# Patient Record
Sex: FEMALE | NOT HISPANIC OR LATINO | Employment: OTHER | URBAN - METROPOLITAN AREA
[De-identification: names, ages, dates, MRNs, and addresses within clinical notes are randomized per-mention and may not be internally consistent; named-entity substitution may affect disease eponyms.]

---

## 2018-01-19 ENCOUNTER — THERAPY VISIT (OUTPATIENT)
Dept: PHYSICAL THERAPY | Facility: CLINIC | Age: 68
End: 2018-01-19
Payer: COMMERCIAL

## 2018-01-19 DIAGNOSIS — G89.29 CHRONIC BILATERAL LOW BACK PAIN WITHOUT SCIATICA: Primary | Chronic | ICD-10-CM

## 2018-01-19 DIAGNOSIS — M54.50 CHRONIC BILATERAL LOW BACK PAIN WITHOUT SCIATICA: Primary | Chronic | ICD-10-CM

## 2018-01-19 DIAGNOSIS — Z98.890 H/O LUMBOSACRAL SPINE SURGERY: ICD-10-CM

## 2018-01-19 PROCEDURE — 97110 THERAPEUTIC EXERCISES: CPT | Mod: GP | Performed by: PHYSICAL THERAPIST

## 2018-01-19 PROCEDURE — 97140 MANUAL THERAPY 1/> REGIONS: CPT | Mod: GP | Performed by: PHYSICAL THERAPIST

## 2018-01-19 PROCEDURE — 97161 PT EVAL LOW COMPLEX 20 MIN: CPT | Mod: GP | Performed by: PHYSICAL THERAPIST

## 2018-01-19 NOTE — LETTER
[facility] ATHLETIC [facility] PHYSICAL [facility]  [address]  [facility] [id number] [phone]    2018    Re: Halina Edwards   :   1950  MRN:  [id number]   REFERRING PHYSICIAN:   Edelmira Castillo    [facility] ATHLETIC [facility]  Date of Initial Evaluation:  18  Visits:  Rxs Used: 1  Reason for Referral:     Chronic bilateral low back pain without sciatica  H/O lumbosacral spine surgery    EVALUATION SUMMARY    [facility] Initial Evaluation  Subjective:  Patient is a 67 year old female presenting with rehab back hpi. The history is provided by the patient. No  was used.   Halina Edwards is a 67 year old female with a lumbar condition.  Condition occurred with:  Degenerative joint disease.  Condition occurred: for unknown reasons.  This is a chronic condition  Has been dealing with low back pain for the last year.   Unknown cause. Originally tried treating with chiro without relief.  Had a laminectomy/discectomy in low back in 2017 (Dr. Bullard).  Pain never seemed to really improve.  Has had a lot of tenderness in low back after surgery.  Most current MRI after surgery showed a lot of inflammation in low back.  Pain worse with sitting too long (>1 hr), standing too long (5-10 min).  Pain significantly increases with walking >5-10 min.  Sleeping not going well.  Waking ~2-3x/night.  Low back feels better with sitting within reasonable amount of time..    Patient reports pain:  Lower lumbar spine.  Radiates to:  Gluteals left and gluteals right.  Pain is described as aching, sharp, burning and stabbing and is constant and reported as 8/10.  Associated symptoms:  Loss of motion/stiffness, loss of motion and loss of strength.   Symptoms are exacerbated by certain positions, lying down, twisting, sitting, lifting, walking and standing and relieved by rest.  Since onset symptoms are unchanged.     Previous treatment includes chiropractic and surgery.  There was mild improvement following previous treatment.  General health as reported by patient is fair.  Pertinent medical history includes:  Diabetes, overweight, high blood pressure and smoking.  Medical allergies: yes (penecillin).  Other surgeries include:  Orthopedic surgery (lumbar decompression).  Current medications:  Pain medication, anti-depressants and high blood pressure medication.  Current occupation is Sales    Pt goal: improve tolerance to walking and standing tolerance.  Patient is working in normal job without restrictions.  Primary job tasks include:  Prolonged sitting and repetitive tasks.    Barriers include:  None as reported by the patient.    Red flags:  None as reported by the patient.    Objective:                                    Musculoskeletal:        Arms:      Halina CLEMENT Jerry , : 1950, MRN: [id number]    Physical Therapy Objective Findings  Subjective information, goals, clinical impression, daily documentation and other information found in EPISODES tab.  Objective:     Lumbar Pain    Posture: sitting: mild slouch, posture correction: no change, standing:   Gait:  Decreased rotation  Lumbar Range of Motion:  Flexion        With amirah                                         75%                                                                                                                         Increased pain in low back   Extension 25%   Right Side Bending 25%   Left Side Bending 25%   Repeated extension- standing    Repeated flexion- standing      Pelvic screen:                                                                         Positive                                            Negative                                             Standing Forward Bend  x   Gillet(March)  x   Supine to sit     Sacroiliac provocation test  x   Pubic symphysis provocation            -resisted hip add at 45  x   Other:       Hip  Screen:                                                                  Positive                                             Negative                                             Hip ROM X limited IR B in 90 flex, limited hip ER in prone B    Scour  x   MARJORIE X B    FADIR  x   Other:     Manual Muscle Testing (graded 0-5, measured at 0 degrees unless otherwise noted):                                                                              Right                                  Left                                                     Transversus Abdominus     -Ramos Leg Lowering (deg) 50%    Hip Flex L2 4 (+) 4 (+)   Hip Abd 3 3   Hip Add     Hip Ext 3 3   Knee Flex 4 (+) 4+ (+)   Knee Ext L3 5 5   Ankle Dorsiflexion L4 5 5   Great Toe Extension L5 5 5   Ankle Plantar Flexion S1 5 5   Other:     (+ mild pain, ++ moderate pain, +++ severe pain)    Special Tests:                                                                     Positive                                             Negative                                             Sign of Buttock  x   SLR X B    Robert Test X - R 7 de, L 10 deg    Ely Test     Prone instability Test X L3    Crossover SLR     Repeated extension prone     Other:       Flexibility:                                                              Right                                                 Left                                                      Hamstring SLR 70  SLR 70   Hip flexor 7 deg 10 deg   Quadricep Mod tightness Mod tightness   Suzanne's     piriformis Mod tightness Mod tightness   Other:            Segmental Mobility: hypomobile L5, T12-L2    Palpation: hypomobile incision    -If symptoms past hip, must do neuro testing  Dermatome/Sensory  Testing: normal to light touch BLE  Reflex Testing:                                                                 Right                                                  Left                                                      Patellar Tendon normal normal   Achilles Tendon normal normal   Babinski         Assessment/Plan:    Patient is a 67 year old female with lumbar complaints.    Patient has the following significant findings with corresponding treatment plan.                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  Pain -  hot/cold therapy, manual therapy, self management, education and home program  Decreased ROM/flexibility - manual therapy, therapeutic exercise, therapeutic activity and home program  Decreased joint mobility - manual therapy, therapeutic exercise, therapeutic activity and home program  Decreased strength - therapeutic exercise, therapeutic activities and home program  Decreased function - therapeutic activities and home program  Impaired posture - neuro re-education, therapeutic activities and home program    Therapy Evaluation Codes:   1) History comprised of:   Personal factors that impact the plan of care:      Age, Past/current experiences, Profession and Time since onset of symptoms.    Comorbidity factors that impact the plan of care are:      Diabetes, Depression and Overweight.     Medications impacting care: Anti-depressant, Anti-inflammatory and Pain.  2) Examination of Body Systems comprised of:   Body structures and functions that impact the plan of care:      Lumbar spine.   Activity limitations that impact the plan of care are:      Bathing, Driving, Lifting, Sitting, Stairs, Standing, Walking and Working.  3) Clinical presentation characteristics are:   Stable/Uncomplicated.  4) Decision-Making    Low complexity using standardized patient assessment instrument and/or measureable assessment of functional outcome.  Cumulative Therapy Evaluation is: Low complexity.    Previous and current functional limitations:  (See Goal Flow Sheet for this information)    Short term and Long term goals: (See Goal Flow Sheet for this information)      Communication ability:  Patient appears to be able to clearly communicate and understand verbal and written communication and follow directions correctly.  Treatment Explanation - The following has been discussed with the patient:   RX ordered/plan of care  Anticipated outcomes  Possible risks and side effects  This patient would benefit from PT intervention to resume normal activities.   Rehab potential is good.    Frequency:  1 X week, once daily  Duration:  for 6 weeks  Discharge Plan:  Achieve all LTG.  Independent in home treatment program.  Reach maximal therapeutic benefit.      Thank you for your referral.    INQUIRIES  Therapist: Gael Vizcaino,PT, DPT, [facility]    [facility]  [address] [place] [address]. [address]  [facility] [address]  Phone: [phone]  Fax: [phone]

## 2018-01-19 NOTE — MR AVS SNAPSHOT
"              After Visit Summary   1/19/2018    Halina Edwards    MRN: [id number]           Patient Information     Date Of Birth          1950        Visit Information        Provider Department      1/19/2018 7:00 AM Gael Vizcaino, PT [facility] Physical Therapy        Today's Diagnoses     Chronic bilateral low back pain without sciatica    -  1    H/O lumbosacral spine surgery           Follow-ups after your visit        Your next 10 appointments already scheduled     Jan 25, 2018  4:30 PM CST   JACQUELIN Spine with Gael Vizcaino PT   [facility] ([facility]  )    [address]  [facility] [address] [phone]              Who to contact     If you have questions or need follow up information about today's clinic visit or your schedule please contact [facility] ATHLETIC [facility] PHYSICAL THERAPY directly at [phone].  Normal or non-critical lab and imaging results will be communicated to you by [employer], letter or phone within 4 business days after the clinic has received the results. If you do not hear from us within 7 days, please contact the clinic through [employer] or phone. If you have a critical or abnormal lab result, we will notify you by phone as soon as possible.  Submit refill requests through [employer] or call your pharmacy and they will forward the refill request to us. Please allow 3 business days for your refill to be completed.          Additional Information About Your Visit        [employer] Information     [employer] lets you send messages to your doctor, view your test results, renew your prescriptions, schedule appointments and more. To sign up, go to [email or web address] . Click on \"Log in\" on the left side of the screen, which will take you to the Welcome page. Then click on \"Sign up Now\" on the right side of the page.     You will be asked to enter the access code " listed below, as well as some personal information. Please follow the directions to create your username and password.     Your access code is: [email or web address]  Expires: 2018  9:36 AM     Your access code will  in 90 days. If you need help or a new code, please call your [place] clinic or [phone].        Care EveryWhere ID     This is your Care EveryWhere ID. This could be used by other organizations to access your [facility] medical records  [id number]        Your Vitals Were     Last Period                   09/10/2005            Blood Pressure from Last 3 Encounters:   09/08/10 131/77   06/10/10 151/82   04/01/10 145/81    Weight from Last 3 Encounters:   05 109.2 kg (240 lb 12 oz)   09/15/05 109.8 kg (242 lb)   05 108.4 kg (239 lb 0.6 oz)              We Performed the Following     HC PT EVAL, LOW COMPLEXITY     JACQUELIN INITIAL EVAL REPORT     MANUAL THER TECH,1+REGIONS,EA 15 MIN     THERAPEUTIC EXERCISES        Primary Care Provider Office Phone # Fax #    Halina Arias PA-C [phone]       [address] [place] [address]        Equal Access to Services     GURPREET BACON AH: Hadii aad ku hadasho Soomaali, waaxda luqadaha, qaybta kaalmada adeegyada, braulio lopezin hayrenyn jeffry león. So [facility] [phone].    ATENCIÓN: Si habla español, tiene a brewer disposición servicios gratuitos de asistencia lingüística. Llame al [phone].    We comply with applicable federal civil rights laws and [place] laws. We do not discriminate on the basis of race, color, national origin, age, disability, sex, sexual orientation, or gender identity.            Thank you!     Thank you for choosing [facility]  for your care. Our goal is always to provide you with excellent care. Hearing back from our patients is one way we can continue to improve our services. Please take a few minutes to complete the written survey that you may receive  in the mail after your visit with us. Thank you!             Your Updated Medication List - Protect others around you: Learn how to safely use, store and throw away your medicines at www.disposemymeds.org.          This list is accurate as of: 1/19/18  9:36 AM.  Always use your most recent med list.                   Brand Name Dispense Instructions for use Diagnosis    azithromycin 250 MG tablet    ZITHROMAX    6 Tab    take 1 Tab by mouth daily. two tablets first day and 1 tablet daily for 4 days    Maxillary sinusitis       benzonatate 200 MG capsule    TESSALON    20 Cap    take 1 Cap by mouth 3 times daily as needed for cough.    Maxillary sinusitis       metFORMIN 500 MG tablet    GLUCOPHAGE     2 tablets twice daily        METOPROLOL TARTRATE      None Entered        NOVOFINE 31 31G X 6 MM   Generic drug:  insulin pen needle     100    use as directed        NovoLOG FLEXpen 100 UNITS/ML injection   Generic drug:  insulin aspart     15    as directed with each of her 3 meals as of 3/10/2005 11 units each meal)        PRAVACHOL PO      None Entered        RAMIPRIL PO      None Entered

## 2018-01-19 NOTE — PROGRESS NOTES
[facility] Initial Evaluation  Subjective:  Patient is a 67 year old female presenting with rehab back hpi. The history is provided by the patient. No  was used.   Halina Edwards is a 67 year old female with a lumbar condition.  Condition occurred with:  Degenerative joint disease.  Condition occurred: for unknown reasons.  This is a chronic condition  Has been dealing with low back pain for the last year.   Unknown cause. Originally tried treating with chiro without relief.  Had a laminectomy/discectomy in low back in June 2017 (Dr. Bullard).  Pain never seemed to really improve.  Has had a lot of tenderness in low back after surgery.  Most current MRI after surgery showed a lot of inflammation in low back.  Pain worse with sitting too long (>1 hr), standing too long (5-10 min).  Pain significantly increases with walking >5-10 min.  Sleeping not going well.  Waking ~2-3x/night.  Low back feels better with sitting within reasonable amount of time..    Patient reports pain:  Lower lumbar spine.  Radiates to:  Gluteals left and gluteals right.  Pain is described as aching, sharp, burning and stabbing and is constant and reported as 8/10.  Associated symptoms:  Loss of motion/stiffness, loss of motion and loss of strength.   Symptoms are exacerbated by certain positions, lying down, twisting, sitting, lifting, walking and standing and relieved by rest.  Since onset symptoms are unchanged.    Previous treatment includes chiropractic and surgery.  There was mild improvement following previous treatment.  General health as reported by patient is fair.  Pertinent medical history includes:  Diabetes, overweight, high blood pressure and smoking.  Medical allergies: yes (penecillin).  Other surgeries include:  Orthopedic surgery (lumbar decompression).  Current medications:  Pain medication, anti-depressants and high blood pressure medication.  Current occupation is Sales    Pt goal:  improve tolerance to walking and standing tolerance.  Patient is working in normal job without restrictions.  Primary job tasks include:  Prolonged sitting and repetitive tasks.    Barriers include:  None as reported by the patient.    Red flags:  None as reported by the patient.                        Objective:  System    Physical Exam                                         Musculoskeletal:        Arms:      JULIAN Edwards , : 1950, MRN: [id number]    Physical Therapy Objective Findings  Subjective information, goals, clinical impression, daily documentation and other information found in EPISODES tab.  Objective:     Lumbar Pain    Posture: sitting: mild slouch, posture correction: no change, standing:   Gait:  Decreased rotation  Lumbar Range of Motion:  Flexion        With amirah                                         75%                                                                                                                         Increased pain in low back   Extension 25%   Right Side Bending 25%   Left Side Bending 25%   Repeated extension- standing    Repeated flexion- standing      Pelvic screen:                                                                         Positive                                            Negative                                             Standing Forward Bend  x   Gillet(March)  x   Supine to sit     Sacroiliac provocation test  x   Pubic symphysis provocation            -resisted hip add at 45  x   Other:       Hip Screen:                                                                  Positive                                             Negative                                             Hip ROM X limited IR B in 90 flex, limited hip ER in prone B    Scour  x   MARJORIE X B    FADIR  x   Other:     Manual Muscle Testing (graded 0-5, measured at 0 degrees unless otherwise noted):                                                                               Right                                  Left                                                     Transversus Abdominus     -Ramos Leg Lowering (deg) 50%    Hip Flex L2 4 (+) 4 (+)   Hip Abd 3 3   Hip Add     Hip Ext 3 3   Knee Flex 4 (+) 4+ (+)   Knee Ext L3 5 5   Ankle Dorsiflexion L4 5 5   Great Toe Extension L5 5 5   Ankle Plantar Flexion S1 5 5   Other:     (+ mild pain, ++ moderate pain, +++ severe pain)    Special Tests:                                                                     Positive                                             Negative                                             Sign of Buttock  x   SLR X B    Robert Test X - R 7 de, L 10 deg    Ely Test     Prone instability Test X L3    Crossover SLR     Repeated extension prone     Other:       Flexibility:                                                              Right                                                 Left                                                      Hamstring SLR 70  SLR 70   Hip flexor 7 deg 10 deg   Quadricep Mod tightness Mod tightness   Suzanne's     piriformis Mod tightness Mod tightness   Other:            Segmental Mobility: hypomobile L5, T12-L2    Palpation: hypomobile incision    -If symptoms past hip, must do neuro testing  Dermatome/Sensory  Testing: normal to light touch BLE  Reflex Testing:                                                                 Right                                                  Left                                                     Patellar Tendon normal normal   Achilles Tendon normal normal   Babinski         Assessment/Plan:    Patient is a 67 year old female with lumbar complaints.    Patient has the following significant findings with corresponding treatment plan.                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  Pain -  hot/cold therapy, manual therapy, self management, education and home  program  Decreased ROM/flexibility - manual therapy, therapeutic exercise, therapeutic activity and home program  Decreased joint mobility - manual therapy, therapeutic exercise, therapeutic activity and home program  Decreased strength - therapeutic exercise, therapeutic activities and home program  Decreased function - therapeutic activities and home program  Impaired posture - neuro re-education, therapeutic activities and home program    Therapy Evaluation Codes:   1) History comprised of:   Personal factors that impact the plan of care:      Age, Past/current experiences, Profession and Time since onset of symptoms.    Comorbidity factors that impact the plan of care are:      Diabetes, Depression and Overweight.     Medications impacting care: Anti-depressant, Anti-inflammatory and Pain.  2) Examination of Body Systems comprised of:   Body structures and functions that impact the plan of care:      Lumbar spine.   Activity limitations that impact the plan of care are:      Bathing, Driving, Lifting, Sitting, Stairs, Standing, Walking and Working.  3) Clinical presentation characteristics are:   Stable/Uncomplicated.  4) Decision-Making    Low complexity using standardized patient assessment instrument and/or measureable assessment of functional outcome.  Cumulative Therapy Evaluation is: Low complexity.    Previous and current functional limitations:  (See Goal Flow Sheet for this information)    Short term and Long term goals: (See Goal Flow Sheet for this information)     Communication ability:  Patient appears to be able to clearly communicate and understand verbal and written communication and follow directions correctly.  Treatment Explanation - The following has been discussed with the patient:   RX ordered/plan of care  Anticipated outcomes  Possible risks and side effects  This patient would benefit from PT intervention to resume normal activities.   Rehab potential is good.    Frequency:  1 X week,  once daily  Duration:  for 6 weeks  Discharge Plan:  Achieve all LTG.  Independent in home treatment program.  Reach maximal therapeutic benefit.    Please refer to the daily flowsheet for treatment today, total treatment time and time spent performing 1:1 timed codes.     Gael Vizcaino,PT, DPT, OCS

## 2018-01-25 ENCOUNTER — THERAPY VISIT (OUTPATIENT)
Dept: PHYSICAL THERAPY | Facility: CLINIC | Age: 68
End: 2018-01-25
Payer: COMMERCIAL

## 2018-01-25 DIAGNOSIS — Z98.890 H/O LUMBOSACRAL SPINE SURGERY: ICD-10-CM

## 2018-01-25 DIAGNOSIS — M54.50 CHRONIC BILATERAL LOW BACK PAIN WITHOUT SCIATICA: ICD-10-CM

## 2018-01-25 DIAGNOSIS — G89.29 CHRONIC BILATERAL LOW BACK PAIN WITHOUT SCIATICA: ICD-10-CM

## 2018-01-25 PROCEDURE — 97110 THERAPEUTIC EXERCISES: CPT | Mod: GP | Performed by: PHYSICAL THERAPIST

## 2018-01-25 PROCEDURE — 97140 MANUAL THERAPY 1/> REGIONS: CPT | Mod: GP | Performed by: PHYSICAL THERAPIST

## 2018-02-01 ENCOUNTER — THERAPY VISIT (OUTPATIENT)
Dept: PHYSICAL THERAPY | Facility: CLINIC | Age: 68
End: 2018-02-01
Payer: COMMERCIAL

## 2018-02-01 DIAGNOSIS — M54.50 CHRONIC BILATERAL LOW BACK PAIN WITHOUT SCIATICA: ICD-10-CM

## 2018-02-01 DIAGNOSIS — G89.29 CHRONIC BILATERAL LOW BACK PAIN WITHOUT SCIATICA: ICD-10-CM

## 2018-02-01 DIAGNOSIS — Z98.890 H/O LUMBOSACRAL SPINE SURGERY: ICD-10-CM

## 2018-02-01 PROCEDURE — 97110 THERAPEUTIC EXERCISES: CPT | Mod: GP | Performed by: PHYSICAL THERAPIST

## 2018-02-01 PROCEDURE — 97140 MANUAL THERAPY 1/> REGIONS: CPT | Mod: GP | Performed by: PHYSICAL THERAPIST

## 2018-02-01 NOTE — MR AVS SNAPSHOT
"              After Visit Summary   2018    Halina Edwards    MRN: [id number]           Patient Information     Date Of Birth          1950        Visit Information        Provider Department      2018 4:30 PM Gael Vizcaino PT [facility]        Today's Diagnoses     Chronic bilateral low back pain without sciatica        H/O lumbosacral spine surgery           Follow-ups after your visit        Who to contact     If you have questions or need follow up information about today's clinic visit or your schedule please contact [facility] directly at [phone].  Normal or non-critical lab and imaging results will be communicated to you by MyChart, letter or phone within 4 business days after the clinic has received the results. If you do not hear from us within 7 days, please contact the clinic through MyChart or phone. If you have a critical or abnormal lab result, we will notify you by phone as soon as possible.  Submit refill requests through [employer] or call your pharmacy and they will forward the refill request to us. Please allow 3 business days for your refill to be completed.          Additional Information About Your Visit        MyChart Information     [employer] lets you send messages to your doctor, view your test results, renew your prescriptions, schedule appointments and more. To sign up, go to [email or web address] . Click on \"Log in\" on the left side of the screen, which will take you to the Welcome page. Then click on \"Sign up Now\" on the right side of the page.     You will be asked to enter the access code listed below, as well as some personal information. Please follow the directions to create your username and password.     Your access code is: [email or web address]  Expires: 2018  9:36 AM     Your access code will  in 90 days. If you need help or a new code, please call your " [facility] or [phone].        Care EveryWhere ID     This is your Care EveryWhere ID. This could be used by other organizations to access your [place] medical records  [id number]        Your Vitals Were     Last Period                   09/10/2005            Blood Pressure from Last 3 Encounters:   09/08/10 131/77   06/10/10 151/82   04/01/10 145/81    Weight from Last 3 Encounters:   09/27/05 109.2 kg (240 lb 12 oz)   09/15/05 109.8 kg (242 lb)   04/07/05 108.4 kg (239 lb 0.6 oz)              We Performed the Following     MANUAL THER TECH,1+REGIONS,EA 15 MIN     THERAPEUTIC EXERCISES        Primary Care Provider Office Phone # Fax #    Halina Arias PA-C [phone] [address] [facility] [address]        Equal Access to Services     [facility]: Hadii aad ku hadasho Soomaali, waaxda luqadaha, qaybta kaalmada adeegyada, waxay idiin hayrodger luque . So [facility] [phone].    ATENCIÓN: Si habla español, tiene a brewer disposición servicios gratuitos de asistencia lingüística. Llame al [phone].    We comply with applicable federal civil rights laws and [place] laws. We do not discriminate on the basis of race, color, national origin, age, disability, sex, sexual orientation, or gender identity.            Thank you!     Thank you for choosing [facility]  for your care. Our goal is always to provide you with excellent care. Hearing back from our patients is one way we can continue to improve our services. Please take a few minutes to complete the written survey that you may receive in the mail after your visit with us. Thank you!             Your Updated Medication List - Protect others around you: Learn how to safely use, store and throw away your medicines at www.disposemymeds.org.          This list is accurate as of 2/1/18  5:10 PM.  Always use your most recent med list.                   Brand Name Dispense  Instructions for use Diagnosis    azithromycin 250 MG tablet    ZITHROMAX    6 Tab    take 1 Tab by mouth daily. two tablets first day and 1 tablet daily for 4 days    Maxillary sinusitis       benzonatate 200 MG capsule    TESSALON    20 Cap    take 1 Cap by mouth 3 times daily as needed for cough.    Maxillary sinusitis       metFORMIN 500 MG tablet    GLUCOPHAGE     2 tablets twice daily        METOPROLOL TARTRATE      None Entered        NOVOFINE 31 31G X 6 MM   Generic drug:  insulin pen needle     100    use as directed        NovoLOG FLEXpen 100 UNITS/ML injection   Generic drug:  insulin aspart     15    as directed with each of her 3 meals as of 3/10/2005 11 units each meal)        PRAVACHOL PO      None Entered        RAMIPRIL PO      None Entered

## 2018-02-01 NOTE — PROGRESS NOTES
Subjective:  HPI  Oswestry Score: 55.56 %                 Objective:  System    Physical Exam    General     ROS    Assessment/Plan:    PROGRESS  REPORT    Progress reporting period is from 1/19/18 to 2/1/18.       SUBJECTIVE  Subjective changes noted by patient:  doing the exercises 1x/day, walking about 30 min, had more pain when she was doing a lot of coughing, sleeping goes pretty well if she takes pain meds before bed, has been dealing with a couple cold in last week, and whole body is more achy    Current Pain level: 9/10.     Previous pain level was  NA Initial Pain level: 10/10.   Changes in function:  Yes (See Goal flowsheet attached for changes in current functional level)  Adverse reaction to treatment or activity: activity - walking too much, sitting too much, standing too much    OBJECTIVE  Changes noted in objective findings:    Objective: + slump R, - slump L, SLR: R 72, L 65 (tingling down L leg), hypomobile incision, hypersensitivity of incision, double leg lowering 60%, MMT: hip abd 3+/5 B, hip flexor contracture: R 5 deg, L 10 deg, + prone instability L3     At eval: + slump B, MMT: hip abd 3/5 B, double leg lowering 50%, hip flex contracture: R 7 deg, L 10 deg    ASSESSMENT/PLAN  Updated problem list and treatment plan: 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 Strength and flexibility better.  Function hasn't changed  Self Management Plans:   Patient has been instructed in a home treatment program.  I have re-evaluated this patient and find that the nature, scope, duration and intensity of the therapy is appropriate for the medical condition of the patient.  Halina continues to require the following intervention to meet STG and LTG's:  PT intervention is no longer required to meet STG/LTG.    Recommendations:  This patient would benefit from continued therapy.     Frequency:  1 X week, once daily  Duration:  for 3 weeks        Please refer to the daily flowsheet for treatment today, total treatment time and time spent performing 1:1 timed codes.      Gael Vizcaino,PT, DPT, OCS

## 2018-04-24 PROBLEM — Z98.890 H/O LUMBOSACRAL SPINE SURGERY: Status: RESOLVED | Noted: 2018-01-19 | Resolved: 2018-04-24

## 2018-04-24 PROBLEM — G89.29 CHRONIC BILATERAL LOW BACK PAIN WITHOUT SCIATICA: Chronic | Status: RESOLVED | Noted: 2018-01-19 | Resolved: 2018-04-24

## 2018-04-24 PROBLEM — M54.50 CHRONIC BILATERAL LOW BACK PAIN WITHOUT SCIATICA: Chronic | Status: RESOLVED | Noted: 2018-01-19 | Resolved: 2018-04-24

## 2018-04-24 NOTE — PROGRESS NOTES
Subjective:  HPI  Oswestry Score: 55.56 %                 Objective:  System    Physical Exam    General     ROS    Assessment/Plan:    DISCHARGE REPORT    Progress reporting period is from 2/1/18 to 4/24/18.       SUBJECTIVE  Subjective changes noted by patient:  Pt has not returned since last PN. Overall change unknown.    At last visit: doing the exercises 1x/day, walking about 30 min, had more pain when she was doing a lot of coughing, sleeping goes pretty well if she takes pain meds before bed, has been dealing with a couple cold in last week, and whole body is more achy    Current Pain level: 9/10.     Previous pain level was  NA Initial Pain level: 10/10.   Changes in function:  None  Adverse reaction to treatment or activity: activity - coughing    OBJECTIVE  Changes noted in objective findings:  Patient has failed to return to therapy so current objective findings are unknown.  Objective: + slump R, - slump L, SLR: R 72, L 65 (tingling down L leg), hypomobile incision, hypersensitivity of incision, double leg lowering 60%, MMT: hip abd 3+/5 B, hip flexor contracture: R 5 deg, L 10 deg     ASSESSMENT/PLAN  Updated problem list and treatment plan: 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    Pain -  home program  Decreased ROM/flexibility - home program  Decreased strength - home program  STG/LTGs have been met or progress has been made towards goals:  None  Assessment of Progress: The patient's condition is unchanged.  The patient has not returned to therapy. Current status is unknown.  Self Management Plans:  Patient has been instructed in a home treatment program.  I have re-evaluated this patient and find that the nature, scope, duration and intensity of the therapy is appropriate for the medical condition of the patient.  Halina continues to require the following intervention to meet STG and LTG's:  PT intervention is no longer required to  meet STG/LTG.    Recommendations:  This patient is ready to be discharged from therapy and continue their home treatment program.    Please refer to the daily flowsheet for treatment today, total treatment time and time spent performing 1:1 timed codes.        Gael Vizcaino,PT, DPT, OCS

## 2018-05-14 ENCOUNTER — THERAPY VISIT (OUTPATIENT)
Dept: PHYSICAL THERAPY | Facility: CLINIC | Age: 68
End: 2018-05-14
Payer: COMMERCIAL

## 2018-05-14 DIAGNOSIS — G89.29 CHRONIC BILATERAL LOW BACK PAIN WITHOUT SCIATICA: Primary | Chronic | ICD-10-CM

## 2018-05-14 DIAGNOSIS — M54.50 CHRONIC BILATERAL LOW BACK PAIN WITHOUT SCIATICA: Primary | Chronic | ICD-10-CM

## 2018-05-14 PROCEDURE — 97110 THERAPEUTIC EXERCISES: CPT | Mod: GP | Performed by: PHYSICAL THERAPIST

## 2018-05-14 PROCEDURE — 97530 THERAPEUTIC ACTIVITIES: CPT | Mod: GP | Performed by: PHYSICAL THERAPIST

## 2018-05-14 NOTE — LETTER
[facility]  [address]  [facility] [id number] [phone]    May 15, 2018    Re: Halina Edwards   :   1950  MRN:  [id number]   REFERRING PHYSICIAN:   Edelmira Castillo    [facility]  Date of Initial Evaluation:  18  Visits:  Rxs Used: 4  Reason for Referral:  Chronic bilateral low back pain without sciatica    PROGRESS  REPORT  Progress reporting period is from 18 to 18.       SUBJECTIVE  Subjective changes noted by patient:  s/p rhizotomy for her low back on 3/20/18, her seems to be slowly getting better, still feels like she is still very limited on her walking, walking limited to 7-8 min, sleeping going pretty well with use of Tylenol PM, mild pain with driving, feels best with sitting    Current Pain level: 3/10 (worst 6/10).     Previous pain level was  9/10 Initial Pain level: 10/10.   Changes in function:  Yes (See Goal flowsheet attached for changes in current functional level)  Adverse reaction to treatment or activity: activity - prolonged walking, standing, exercising    OBJECTIVE  Changes noted in objective findings:    Objective: -standing forward flex, - march, lumbar ROM: flex 80%, ext 50%, R SB 50% ++, L SB 50%, MMT: knee ext 5/5 B, knee flex 4+/5 B, DF 5/5 B, PF 5/5 B, SLR 65 B, 50% restriction B piriformis, - slump B, iso bridge: 0:31/3:00 difficulty 5/5, double leg lowering 50%     ASSESSMENT/PLAN  Updated problem list and treatment plan: 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 with subsequent rhizotomy    Pain -  hot/cold therapy, manual therapy, self management, education and home program  Decreased ROM/flexibility - manual therapy, therapeutic exercise and home program  Decreased strength - therapeutic exercise, therapeutic activities and home program  Decreased function - therapeutic activities  and home program  STG/LTGs have been met or progress has been made towards goals:  Yes (See Goal flow sheet completed today.)  Assessment of Progress: The patient's condition is improving.  Self Management Plans:  Patient has been instructed in a home treatment program.  I have re-evaluated this patient and find that the nature, scope, duration and intensity of the therapy is appropriate for the medical condition of the patient.  Halina continues to require the following intervention to meet STG and LTG's:  PT    Recommendations:  This patient would benefit from continued therapy.     Frequency:  1 X week, once daily  Duration:  for 6 weeks        Thank you for your referral.    INQUIRIES  Therapist: Gael Vizcaino,PT, DPT, [facility]    [facility]  [address] [place] [address] [address]  [facility] [address]  Phone: [phone]  Fax: [phone]

## 2018-05-14 NOTE — MR AVS SNAPSHOT
"              After Visit Summary   5/14/2018    Halina Edwards    MRN: [id number]           Patient Information     Date Of Birth          1950        Visit Information        Provider Department      5/14/2018 2:50 PM Gael Vizcaino, PT [facility] Physical Therapy        Today's Diagnoses     Chronic bilateral low back pain without sciatica    -  1       Follow-ups after your visit        Your next 10 appointments already scheduled     May 21, 2018  4:10 PM CDT   JACQUELIN Spine with Gael Vizcaino PT   [facility] Physical Therapy ([facility]  )    [address]  [facility] [id number] [phone]            May 29, 2018  5:10 PM CDT   JACQUELIN Spine with Gael Vizcaino PT   [facility] Physical Therapy ([facility]  )    [address]  [facility] [id number] [phone]              Who to contact     If you have questions or need follow up information about today's clinic visit or your schedule please contact [facility] PHYSICAL THERAPY directly at [phone].  Normal or non-critical lab and imaging results will be communicated to you by MyChart, letter or phone within 4 business days after the clinic has received the results. If you do not hear from us within 7 days, please contact the clinic through MyChart or phone. If you have a critical or abnormal lab result, we will notify you by phone as soon as possible.  Submit refill requests through [employer] or call your pharmacy and they will forward the refill request to us. Please allow 3 business days for your refill to be completed.          Additional Information About Your Visit        [employer] Information     [employer] lets you send messages to your doctor, view your test results, renew your prescriptions, schedule appointments and more. To sign up, go to [email or web address] . Click on \"Log " "in\" on the left side of the screen, which will take you to the Welcome page. Then click on \"Sign up Now\" on the right side of the page.     You will be asked to enter the access code listed below, as well as some personal information. Please follow the directions to create your username and password.     Your access code is: [email or web address]  Expires: 2018  3:37 PM     Your access code will  in 90 days. If you need help or a new code, please call your [place] clinic or [phone].        Care EveryWhere ID     This is your Care EveryWhere ID. This could be used by other organizations to access your [place] medical records  [id number]        Your Vitals Were     Last Period                   09/10/2005            Blood Pressure from Last 3 Encounters:   09/08/10 131/77   06/10/10 151/82   04/01/10 145/81    Weight from Last 3 Encounters:   05 109.2 kg (240 lb 12 oz)   09/15/05 109.8 kg (242 lb)   05 108.4 kg (239 lb 0.6 oz)              We Performed the Following     JACQUELIN PROGRESS NOTES REPORT     THERAPEUTIC ACTIVITIES     THERAPEUTIC EXERCISES        Primary Care Provider Office Phone # Fax #    Halina Arias PA-C [phone]       [address] [place] [id number]        Equal Access to Services     [employer] : Hadii aad ku hadasho Soomaali, waaxda luqadaha, qaybta kaalmada adeegyada, braulio luque . So [facility] [phone].    ATENCIÓN: Si habla español, tiene a brewer disposición servicios gratuitos de asistencia lingüística. Jovan al [phone].    We comply with applicable federal civil rights laws and [place] laws. We do not discriminate on the basis of race, color, national origin, age, disability, sex, sexual orientation, or gender identity.            Thank you!     Thank you for choosing [facility]  for your care. Our goal is always to provide you with excellent care. Hearing back from " our patients is one way we can continue to improve our services. Please take a few minutes to complete the written survey that you may receive in the mail after your visit with us. Thank you!             Your Updated Medication List - Protect others around you: Learn how to safely use, store and throw away your medicines at www.disposemymeds.org.          This list is accurate as of 5/14/18  3:37 PM.  Always use your most recent med list.                   Brand Name Dispense Instructions for use Diagnosis    azithromycin 250 MG tablet    ZITHROMAX    6 Tab    take 1 Tab by mouth daily. two tablets first day and 1 tablet daily for 4 days    Maxillary sinusitis       benzonatate 200 MG capsule    TESSALON    20 Cap    take 1 Cap by mouth 3 times daily as needed for cough.    Maxillary sinusitis       metFORMIN 500 MG tablet    GLUCOPHAGE     2 tablets twice daily        METOPROLOL TARTRATE      None Entered        NOVOFINE 31 31G X 6 MM   Generic drug:  insulin pen needle     100    use as directed        NovoLOG FLEXpen 100 UNITS/ML injection   Generic drug:  insulin aspart     15    as directed with each of her 3 meals as of 3/10/2005 11 units each meal)        PRAVACHOL PO      None Entered        RAMIPRIL PO      None Entered

## 2018-05-14 NOTE — PROGRESS NOTES
Subjective:  HPI                    Objective:  System    Physical Exam    General     ROS    Assessment/Plan:    PROGRESS  REPORT    Progress reporting period is from 4/24/18 to 5/14/18.       SUBJECTIVE  Subjective changes noted by patient:  s/p rhizotomy for her low back on 3/20/18, her seems to be slowly getting better, still feels like she is still very limited on her walking, walking limited to 7-8 min, sleeping going pretty well with use of Tylenol PM, mild pain with driving, feels best with sitting    Current Pain level: 3/10 (worst 6/10).     Previous pain level was  9/10 Initial Pain level: 10/10.   Changes in function:  Yes (See Goal flowsheet attached for changes in current functional level)  Adverse reaction to treatment or activity: activity - prolonged walking, standing, exercising    OBJECTIVE  Changes noted in objective findings:    Objective: -standing forward flex, - march, lumbar ROM: flex 80%, ext 50%, R SB 50% ++, L SB 50%, MMT: knee ext 5/5 B, knee flex 4+/5 B, DF 5/5 B, PF 5/5 B, SLR 65 B, 50% restriction B piriformis, - slump B, iso bridge: 0:31/3:00 difficulty 5/5, double leg lowering 50%     ASSESSMENT/PLAN  Updated problem list and treatment plan: 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 with subsequent rhizotomy    Pain -  hot/cold therapy, manual therapy, self management, education and home program  Decreased ROM/flexibility - manual therapy, therapeutic exercise and home program  Decreased strength - therapeutic exercise, therapeutic activities and home program  Decreased function - therapeutic activities and home program  STG/LTGs have been met or progress has been made towards goals:  Yes (See Goal flow sheet completed today.)  Assessment of Progress: The patient's condition is improving.  Self Management Plans:  Patient has been instructed in a home treatment program.  I have re-evaluated this patient and find that the  nature, scope, duration and intensity of the therapy is appropriate for the medical condition of the patient.  Halina continues to require the following intervention to meet STG and LTG's:  PT    Recommendations:  This patient would benefit from continued therapy.     Frequency:  1 X week, once daily  Duration:  for 6 weeks        Please refer to the daily flowsheet for treatment today, total treatment time and time spent performing 1:1 timed codes.      Gael Vizcaino,PT, DPT, OCS

## 2018-05-29 ENCOUNTER — THERAPY VISIT (OUTPATIENT)
Dept: PHYSICAL THERAPY | Facility: CLINIC | Age: 68
End: 2018-05-29
Payer: COMMERCIAL

## 2018-05-29 DIAGNOSIS — M54.50 CHRONIC BILATERAL LOW BACK PAIN WITHOUT SCIATICA: ICD-10-CM

## 2018-05-29 DIAGNOSIS — G89.29 CHRONIC BILATERAL LOW BACK PAIN WITHOUT SCIATICA: ICD-10-CM

## 2018-05-29 PROCEDURE — 97110 THERAPEUTIC EXERCISES: CPT | Mod: GP | Performed by: PHYSICAL THERAPIST

## 2018-05-29 PROCEDURE — 97530 THERAPEUTIC ACTIVITIES: CPT | Mod: GP | Performed by: PHYSICAL THERAPIST

## 2018-06-08 ENCOUNTER — THERAPY VISIT (OUTPATIENT)
Dept: PHYSICAL THERAPY | Facility: CLINIC | Age: 68
End: 2018-06-08
Payer: COMMERCIAL

## 2018-06-08 DIAGNOSIS — M54.50 CHRONIC BILATERAL LOW BACK PAIN WITHOUT SCIATICA: ICD-10-CM

## 2018-06-08 DIAGNOSIS — G89.29 CHRONIC BILATERAL LOW BACK PAIN WITHOUT SCIATICA: ICD-10-CM

## 2018-06-08 PROCEDURE — 97110 THERAPEUTIC EXERCISES: CPT | Mod: GP | Performed by: PHYSICAL THERAPIST

## 2018-06-08 PROCEDURE — 97530 THERAPEUTIC ACTIVITIES: CPT | Mod: GP | Performed by: PHYSICAL THERAPIST

## 2018-06-08 NOTE — MR AVS SNAPSHOT
"              After Visit Summary   2018    Halina Edwards    MRN: [id number]           Patient Information     Date Of Birth          1950        Visit Information        Provider Department      2018 7:00 AM Gael Vizcaino PT [facility]        Today's Diagnoses     Chronic bilateral low back pain without sciatica           Follow-ups after your visit        Who to contact     If you have questions or need follow up information about today's clinic visit or your schedule please contact [facility] directly at [phone].  Normal or non-critical lab and imaging results will be communicated to you by [employer], letter or phone within 4 business days after the clinic has received the results. If you do not hear from us within 7 days, please contact the clinic through [employer] or phone. If you have a critical or abnormal lab result, we will notify you by phone as soon as possible.  Submit refill requests through [employer] or call your pharmacy and they will forward the refill request to us. Please allow 3 business days for your refill to be completed.          Additional Information About Your Visit        MyChart Information     [employer] lets you send messages to your doctor, view your test results, renew your prescriptions, schedule appointments and more. To sign up, go to [email or web address] . Click on \"Log in\" on the left side of the screen, which will take you to the Welcome page. Then click on \"Sign up Now\" on the right side of the page.     You will be asked to enter the access code listed below, as well as some personal information. Please follow the directions to create your username and password.     Your access code is: [email or web address]  Expires: 2018  3:37 PM     Your access code will  in 90 days. If you need help or a new code, please call your [place] clinic or [phone].      "   Care EveryWhere ID     This is your Care EveryWhere ID. This could be used by other organizations to access your [place] medical records  [id number]        Your Vitals Were     Last Period                   09/10/2005            Blood Pressure from Last 3 Encounters:   09/08/10 131/77   06/10/10 151/82   04/01/10 145/81    Weight from Last 3 Encounters:   09/27/05 109.2 kg (240 lb 12 oz)   09/15/05 109.8 kg (242 lb)   04/07/05 108.4 kg (239 lb 0.6 oz)              We Performed the Following     THERAPEUTIC ACTIVITIES     THERAPEUTIC EXERCISES        Primary Care Provider Office Phone # Fax #    Halina Arias PA-C [phone]       [address] [place] [address]        Equal Access to Services     GURPREET BACON : Hadii sarita dueñas hadasho Soomaali, waaxda luqadaha, qaybta kaalmada adeegyada, braulio wright hayrodger luque . So [facility] [phone].    ATENCIÓN: Si habla español, tiene a brewer disposición servicios gratuitos de asistencia lingüística. [facility] [phone].    We comply with applicable federal civil rights laws and [place] laws. We do not discriminate on the basis of race, color, national origin, age, disability, sex, sexual orientation, or gender identity.            Thank you!     Thank you for choosing [facility] PH[facility]ER[facility]  for your care. Our goal is always to provide you with excellent care. Hearing back from our patients is one way we can continue to improve our services. Please take a few minutes to complete the written survey that you may receive in the mail after your visit with us. Thank you!             Your Updated Medication List - Protect others around you: Learn how to safely use, store and throw away your medicines at www.disposemymeds.org.          This list is accurate as of 6/8/18  7:46 AM.  Always use your most recent med list.                   Brand Name Dispense Instructions for use Diagnosis    azithromycin 250  MG tablet    ZITHROMAX    6 Tab    take 1 Tab by mouth daily. two tablets first day and 1 tablet daily for 4 days    Maxillary sinusitis       benzonatate 200 MG capsule    TESSALON    20 Cap    take 1 Cap by mouth 3 times daily as needed for cough.    Maxillary sinusitis       metFORMIN 500 MG tablet    GLUCOPHAGE     2 tablets twice daily        METOPROLOL TARTRATE      None Entered        NOVOFINE 31 31G X 6 MM   Generic drug:  insulin pen needle     100    use as directed        NovoLOG FLEXpen 100 UNITS/ML injection   Generic drug:  insulin aspart     15    as directed with each of her 3 meals as of 3/10/2005 11 units each meal)        PRAVACHOL PO      None Entered        RAMIPRIL PO      None Entered

## 2018-08-14 ENCOUNTER — THERAPY VISIT (OUTPATIENT)
Dept: PHYSICAL THERAPY | Facility: CLINIC | Age: 68
End: 2018-08-14
Payer: COMMERCIAL

## 2018-08-14 DIAGNOSIS — M54.50 CHRONIC BILATERAL LOW BACK PAIN WITHOUT SCIATICA: Primary | Chronic | ICD-10-CM

## 2018-08-14 DIAGNOSIS — G89.29 CHRONIC BILATERAL LOW BACK PAIN WITHOUT SCIATICA: Primary | Chronic | ICD-10-CM

## 2018-08-14 PROCEDURE — 97110 THERAPEUTIC EXERCISES: CPT | Mod: GP | Performed by: PHYSICAL THERAPIST

## 2018-08-14 PROCEDURE — 97140 MANUAL THERAPY 1/> REGIONS: CPT | Mod: GP | Performed by: PHYSICAL THERAPIST

## 2018-08-14 PROCEDURE — 97112 NEUROMUSCULAR REEDUCATION: CPT | Mod: GP | Performed by: PHYSICAL THERAPIST

## 2018-08-14 NOTE — MR AVS SNAPSHOT
"              After Visit Summary   8/14/2018    Halina Edwards    MRN: [id number]           Patient Information     Date Of Birth          1950        Visit Information        Provider Department      8/14/2018 3:50 PM Gael Vizcaino, PT [facility] Physical Therapy        Today's Diagnoses     Chronic bilateral low back pain without sciatica    -  1       Follow-ups after your visit        Your next 10 appointments already scheduled     Aug 20, 2018  3:50 PM CDT   JACQUELIN Spine with Dayton Roberts PT   [facility] ([facility]  )    [address]  [facility] [address]   [phone]            Aug 22, 2018  3:50 PM CDT   JACQUELIN Spine with Dayton Roberts PT   [facility] ([facility]  )    [address]  [facility] [address]   [phone]              Who to contact     If you have questions or need follow up information about today's clinic visit or your schedule please contact [facility] PHYSICAL THERAPY directly at [phone].  Normal or non-critical lab and imaging results will be communicated to you by MyChart, letter or phone within 4 business days after the clinic has received the results. If you do not hear from us within 7 days, please contact the clinic through [employer] or phone. If you have a critical or abnormal lab result, we will notify you by phone as soon as possible.  Submit refill requests through [employer] or call your pharmacy and they will forward the refill request to us. Please allow 3 business days for your refill to be completed.          Additional Information About Your Visit        [employer] Information     [employer] lets you send messages to your doctor, view your test results, renew your prescriptions, schedule appointments and more. To sign up, go to [email or web address] . Click on \"Log in\" on " "the left side of the screen, which will take you to the Welcome page. Then click on \"Sign up Now\" on the right side of the page.     You will be asked to enter the access code listed below, as well as some personal information. Please follow the directions to create your username and password.     Your access code is: [email or web address]  Expires: 2018  5:55 PM     Your access code will  in 90 days. If you need help or a new code, please call your [place] clinic or [phone].        Care EveryWhere ID     This is your Care EveryWhere ID. This could be used by other organizations to access your [place] medical records  [id number]        Your Vitals Were     Last Period                   09/10/2005            Blood Pressure from Last 3 Encounters:   09/08/10 131/77   06/10/10 151/82   04/01/10 145/81    Weight from Last 3 Encounters:   05 109.2 kg (240 lb 12 oz)   09/15/05 109.8 kg (242 lb)   05 108.4 kg (239 lb 0.6 oz)              We Performed the Following     JACQUELIN PROGRESS NOTES REPORT     MANUAL THER TECH,1+REGIONS,EA 15 MIN     NEUROMUSCULAR RE-EDUCATION     THERAPEUTIC EXERCISES        Primary Care Provider Office Phone # Fax #    Halina Arias PA-C [phone]       [address] [facility] [address]        Equal Access to Services     [facility]: Hadii aad ku hadasho Soomaali, waaxda luqadaha, qaybta kaalmada adeegyada, braulio luque . So [facility] [phone].    ATENCIÓN: Si habla español, tiene a brewer disposición servicios gratuitos de asistencia lingüística. Jovan al [phone].    We comply with applicable federal civil rights laws and [place] laws. We do not discriminate on the basis of race, color, national origin, age, disability, sex, sexual orientation, or gender identity.            Thank you!     Thank you for choosing [facility] PHYSICAL THERAPY  for your care. Our goal is always to provide you " with excellent care. Hearing back from our patients is one way we can continue to improve our services. Please take a few minutes to complete the written survey that you may receive in the mail after your visit with us. Thank you!             Your Updated Medication List - Protect others around you: Learn how to safely use, store and throw away your medicines at www.disposemymeds.org.          This list is accurate as of 8/14/18  5:55 PM.  Always use your most recent med list.                   Brand Name Dispense Instructions for use Diagnosis    azithromycin 250 MG tablet    ZITHROMAX    6 Tab    take 1 Tab by mouth daily. two tablets first day and 1 tablet daily for 4 days    Maxillary sinusitis       benzonatate 200 MG capsule    TESSALON    20 Cap    take 1 Cap by mouth 3 times daily as needed for cough.    Maxillary sinusitis       metFORMIN 500 MG tablet    GLUCOPHAGE     2 tablets twice daily        METOPROLOL TARTRATE      None Entered        NOVOFINE 31 31G X 6 MM   Generic drug:  insulin pen needle     100    use as directed        NovoLOG FLEXpen 100 UNITS/ML injection   Generic drug:  insulin aspart     15    as directed with each of her 3 meals as of 3/10/2005 11 units each meal)        PRAVACHOL PO      None Entered        RAMIPRIL PO      None Entered

## 2018-08-14 NOTE — PROGRESS NOTES
Subjective:  HPI  Oswestry Score: 52 %                 Objective:  System    Physical Exam    General     ROS    Assessment/Plan:    PROGRESS  REPORT    Progress reporting period is from 6/8/18 to 8/14/18.       SUBJECTIVE  Subjective changes noted by patient:  after having her rhizotomy in May, she had some improvement, but pain has returned, the pain slowly increased after the rhizotomy, the pain seems to move from right to left side, walking ~15-20 min around grocery store, sitting limited to 45 min, standing limited to 10min, her back and butt muscles seem to be  really weak    Current Pain level: 6/10.     Previous pain level was  3-5/10 Initial Pain level: 10/10.   Changes in function:  Yes (See Goal flowsheet attached for changes in current functional level)  Adverse reaction to treatment or activity: None    OBJECTIVE  Changes noted in objective findings:    Objective: lumbar ROM: flex 80%, ext 66%, R SB 66%, L SB 66%, pain PA L1-L5, - prone instability, iso bridge: 0:28/3:00, + slump B, SLR R 38, L 49, hip flexor contracture: R 18 in supine, L 15 in supine, hypomoible incision with increased sensitivity over incision     ASSESSMENT/PLAN  Updated problem list and treatment plan: 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 with subsequent rhizotomy  Pain -  hot/cold therapy, manual therapy, self management, education and home program  Decreased ROM/flexibility - manual therapy, therapeutic exercise and home program  Decreased strength - therapeutic exercise, therapeutic activities and home program  Impaired balance - neuro re-education, gait training and therapeutic activities  Decreased function - therapeutic activities, home program and functional performance testing  Impaired posture - neuro re-education and home program  STG/LTGs have been met or progress has been made towards goals:  Yes (See Goal flow sheet completed today.)  Assessment of  Progress: The patient's condition has exacerbated.  Self Management Plans:  Patient has been instructed in a home treatment program.  I have re-evaluated this patient and find that the nature, scope, duration and intensity of the therapy is appropriate for the medical condition of the patient.  Halina continues to require the following intervention to meet STG and LTG's:  PT    Recommendations:  This patient would benefit from continued therapy.     Frequency:  1 X week, once daily  Duration:  for 6 weeks        Please refer to the daily flowsheet for treatment today, total treatment time and time spent performing 1:1 timed codes.      Gael Vizcaino,PT, DPT, OCS

## 2018-08-14 NOTE — PROGRESS NOTES
Subjective:  HPI                    Objective:  System    Physical Exam    General     ROS    Assessment/Plan:    DISCHARGE REPORT    Progress reporting period is from 5/14/18 to 6/8/18.       SUBJECTIVE  Subjective changes noted by patient:  typically doing better, will still get really sore if she is on her feet, walking limited to ~30 min, occasional balance feels off with going up and down stairs, will feel things in her back with lifting >5-10#    Current Pain level: 3/10 (worst 5/10).     Previous pain level was  3-6/10 Initial Pain level: 10/10.   Changes in function:  Yes (See Goal flowsheet attached for changes in current functional level)  Adverse reaction to treatment or activity: None    OBJECTIVE  Changes noted in objective findings:    Objective: lumbar ROM: flex 80%, ext 66%, R SB 50%, L SB 50%, iso bridge: 1:44/3:00, + slump R, SLR R 60 deg, prone knee flex: L 107, R 93     ASSESSMENT/PLAN  Updated problem list and treatment plan: 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 with subsequent rhizotomy    Pain -  home program  Decreased ROM/flexibility - home program  Decreased strength - home program  STG/LTGs have been met or progress has been made towards goals:  Yes (See Goal flow sheet completed today.)  Assessment of Progress: The patient's condition is improving.  Pt independent with HEP  Self Management Plans:  Patient has been instructed in a home treatment program.  I have re-evaluated this patient and find that the nature, scope, duration and intensity of the therapy is appropriate for the medical condition of the patient.  Halina continues to require the following intervention to meet STG and LTG's:  PT intervention is no longer required to meet STG/LTG.    Recommendations:  This patient is ready to be discharged from therapy and continue their home treatment program.    Please refer to the daily flowsheet for treatment today, total  treatment time and time spent performing 1:1 timed codes.      Gael Vizcaino,PT, DPT, OCS

## 2018-08-14 NOTE — LETTER
[facility] ATHLETIC [facility]  [address]  [facility] [id number] [phone]    August 15, 2018    Re: Halina Edwards   :   1950  MRN:  [id number]   REFERRING PHYSICIAN:   Edelmira Castillo    [facility]  Date of Initial Evaluation:  18  Visits:  Rxs Used: 7  Reason for Referral:  Chronic bilateral low back pain without sciatica    Oswestry Score: 52 %                 PROGRESS  REPORT  Progress reporting period is from 18 to 18.       SUBJECTIVE  Subjective changes noted by patient:  after having her rhizotomy in May, she had some improvement, but pain has returned, the pain slowly increased after the rhizotomy, the pain seems to move from right to left side, walking ~15-20 min around grocery store, sitting limited to 45 min, standing limited to 10min, her back and butt muscles seem to be  really weak    Current Pain level: 6/10.     Previous pain level was  3-5/10 Initial Pain level: 10/10.   Changes in function:  Yes (See Goal flowsheet attached for changes in current functional level)  Adverse reaction to treatment or activity: None    OBJECTIVE  Changes noted in objective findings:    Objective: lumbar ROM: flex 80%, ext 66%, R SB 66%, L SB 66%, pain PA L1-L5, - prone instability, iso bridge: 0:28/3:00, + slump B, SLR R 38, L 49, hip flexor contracture: R 18 in supine, L 15 in supine, hypomoible incision with increased sensitivity over incision     ASSESSMENT/PLAN  Updated problem list and treatment plan: 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 with subsequent rhizotomy  Pain -  hot/cold therapy, manual therapy, self management, education and home program  Decreased ROM/flexibility - manual therapy, therapeutic exercise and home program  Decreased strength - therapeutic exercise, therapeutic activities and home program  Impaired  balance - neuro re-education, gait training and therapeutic activities  Decreased function - therapeutic activities, home program and functional performance testing  Impaired posture - neuro re-education and home program  STG/LTGs have been met or progress has been made towards goals:  Yes (See Goal flow sheet completed today.)  Assessment of Progress: The patient's condition has exacerbated.  Self Management Plans:  Patient has been instructed in a home treatment program.  I have re-evaluated this patient and find that the nature, scope, duration and intensity of the therapy is appropriate for the medical condition of the patient.  Halina continues to require the following intervention to meet STG and LTG's:  PT    Recommendations:  This patient would benefit from continued therapy.     Frequency:  1 X week, once daily  Duration:  for 6 weeks    Thank you for your referral.    INQUIRIES  Therapist: Gael Vizcaino,PT, DPT, \[facility] [place] [facility]\""    [facility] [place] [facility]  [address] [place] [address]. [address]  [facility] [address]  Phone: [phone]  Fax: [phone]

## 2018-08-20 ENCOUNTER — THERAPY VISIT (OUTPATIENT)
Dept: PHYSICAL THERAPY | Facility: CLINIC | Age: 68
End: 2018-08-20
Payer: COMMERCIAL

## 2018-08-20 DIAGNOSIS — H81.11 BENIGN PAROXYSMAL POSITIONAL VERTIGO, RIGHT: Primary | ICD-10-CM

## 2018-08-20 PROCEDURE — 95992 CANALITH REPOSITIONING PROC: CPT | Mod: GP | Performed by: PHYSICAL THERAPIST

## 2018-08-20 PROCEDURE — 97161 PT EVAL LOW COMPLEX 20 MIN: CPT | Mod: GP | Performed by: PHYSICAL THERAPIST

## 2018-08-20 NOTE — LETTER
[facility]  [address]  [facility] [address] [phone]    2018    Re: Halina Edwards   :   1950  MRN:  [id number]   REFERRING PHYSICIAN:   Edelmira Castillo    [facility] [place] [facility]  Date of Initial Evaluation:  18  Visits:  Rxs Used: 1  Reason for Referral:  Benign paroxysmal positional vertigo, right    EVALUATION SUMMARY    [facility] Initial Evaluation  Subjective:  Patient is a 67 year old female presenting with rehab general hpi. The history is provided by the patient. No  was used.   General   Condition requiring PT:  Poor balance (Dizziness)  Chronicity:  Recurrent  Onset date of current episode/exacerbation comment:  Pt presents to PT with primary complaint of dizziness, states it started 2 years ago following a surgical procedure. She has had some treatments for it which have helped. States a few months ago she went into her boat for the first time, and has had dizziness since. The dizziness is room spinning at times and other times more of an unstable feeling. She is unsure what direction she turns her head to cause the dizziness. When the dizziness occurs it lasts for 15 seconds or less. She reports no new medications, no new glasses, no recent hearing loss, no increased ringing in the ears. Pt with referral dated 18 for PT evaluate and treat.   Site of Pain: n/a no pain   Radiates to: n/a no pain associated with the dizziness   Quality: Dizziness, balance deficits   Frequency:  Intermittent  Pain Scale: 0/10.  Associated symptoms:  Dizziness and loss of balance  Worse during: n/a no pain, associated with head movements.  Exacerbated by: Head movements   Relieved by: Avoid head movements   Progression since onset:  Unchanged  General health as reported by patient:  Good  Please check all that apply to your current or past medical  history:  Diabetes, high blood pressure and overweight  Medical allergies:  Yes (see EPIC )  Other surgeries:  Orthopedic surgery and other (Knee, back )  Medications you are currently taking:  Anti-depressants and high blood pressure medication (diabetic )  Occupation comment:  Sales     If employed, are you:  Working in normal job without restrictions  What are your primary job tasks:  Prolonged sitting  Barriers at home/work:  Nothing                 Objective:      General Evaluation:  AROM:  normal    Gross Strength:  normal    Reflexes:  not assessed    Balance:  Balance wnl general: Oculomoter exam demonstrating ROM full, (+) sacchades to the R side, VOR and VORc intact, rapid head thrust test (+) B. Halpike john testing demonsrating (+) test for RPCC       Assessment/Plan:    Patient is a 67 year old female with BPPV, dizziness, complaints.    Patient has the following significant findings with corresponding treatment plan.                Diagnosis 1:  Vertgo  Impaired balance - neuro re-education, home program and CRT   Decreased function - home program and CRT  Diagnosis 2:  BPPV   Impaired balance - neuro re-education and CRT  Decreased function - CRT, neuromuscular reeducation     Therapy Evaluation Codes:   1) History comprised of:   Personal factors that impact the plan of care:      None.    Comorbidity factors that impact the plan of care are:      None.     Medications impacting care: None.  2) Examination of Body Systems comprised of:   Body structures and functions that impact the plan of care:      Head, vestubular system .   Activity limitations that impact the plan of care are:      Bending, Driving and Laying down.  3) Clinical presentation characteristics are:   Stable/Uncomplicated.  4) Decision-Making    Low complexity using standardized patient assessment instrument and/or measureable assessment of functional outcome.  Cumulative Therapy Evaluation is: Low complexity.    Previous and  current functional limitations:  (See Goal Flow Sheet for this information)    Short term and Long term goals: (See Goal Flow Sheet for this information)     Communication ability:  Patient appears to be able to clearly communicate and understand verbal and written communication and follow directions correctly.  Treatment Explanation - The following has been discussed with the patient:   RX ordered/plan of care  Anticipated outcomes  Possible risks and side effects  This patient would benefit from PT intervention to resume normal activities.   Rehab potential is good.    Frequency:  1 X week, once daily  Duration:  for 8 weeks  Discharge Plan:  Achieve all LTG.  Independent in home treatment program.  Reach maximal therapeutic benefit.    Thank you for your referral.    INQUIRIES  Therapist: Dayton Roberts    [facility]  [address] [place] [address] [address]  [facility] [address]  Phone: [phone]  Fax: [phone]

## 2018-08-20 NOTE — PROGRESS NOTES
[facility] Initial Evaluation  Subjective:  Patient is a 67 year old female presenting with rehab general hpi. The history is provided by the patient. No  was used.   General   Condition requiring PT:  Poor balance (Dizziness)  Chronicity:  Recurrent  Onset date of current episode/exacerbation comment:  Pt presents to PT with primary complaint of dizziness, states it started 2 years ago following a surgical procedure. She has had some treatments for it which have helped. States a few months ago she went into her boat for the first time, and has had dizziness since. The dizziness is room spinning at times and other times more of an unstable feeling. She is unsure what direction she turns her head to cause the dizziness. When the dizziness occurs it lasts for 15 seconds or less. She reports no new medications, no new glasses, no recent hearing loss, no increased ringing in the ears. Pt with referral dated 7-20-18 for PT evaluate and treat.   Site of Pain: n/a no pain   Radiates to: n/a no pain associated with the dizziness   Quality: Dizziness, balance deficits   Frequency:  Intermittent  Pain Scale: 0/10.  Associated symptoms:  Dizziness and loss of balance  Worse during: n/a no pain, associated with head movements.  Exacerbated by: Head movements   Relieved by: Avoid head movements   Progression since onset:  Unchanged  General health as reported by patient:  Good  Please check all that apply to your current or past medical history:  Diabetes, high blood pressure and overweight  Medical allergies:  Yes (see EPIC )  Other surgeries:  Orthopedic surgery and other (Knee, back )  Medications you are currently taking:  Anti-depressants and high blood pressure medication (diabetic )  Occupation comment:  Sales     If employed, are you:  Working in normal job without restrictions  What are your primary job tasks:  Prolonged sitting  Barriers at home/work:  Nothing                       Objective:  System    Physical Exam        General Evaluation:  AROM:  normal              Gross Strength:  normal                            Reflexes:  not assessed    Balance:  Balance wnl general: Oculomoter exam demonstrating ROM full, (+) sacchades to the R side, VOR and VORc intact, rapid head thrust test (+) B. Halpike john testing demonsrating (+) test for RPCC                                                        ROS    Assessment/Plan:    Patient is a 67 year old female with BPPV, dizziness, complaints.    Patient has the following significant findings with corresponding treatment plan.                Diagnosis 1:  Vertgo  Impaired balance - neuro re-education, home program and CRT   Decreased function - home program and CRT  Diagnosis 2:  BPPV   Impaired balance - neuro re-education and CRT  Decreased function - CRT, neuromuscular reeducation       Therapy Evaluation Codes:   1) History comprised of:   Personal factors that impact the plan of care:      None.    Comorbidity factors that impact the plan of care are:      None.     Medications impacting care: None.  2) Examination of Body Systems comprised of:   Body structures and functions that impact the plan of care:      Head, vestubular system .   Activity limitations that impact the plan of care are:      Bending, Driving and Laying down.  3) Clinical presentation characteristics are:   Stable/Uncomplicated.  4) Decision-Making    Low complexity using standardized patient assessment instrument and/or measureable assessment of functional outcome.  Cumulative Therapy Evaluation is: Low complexity.    Previous and current functional limitations:  (See Goal Flow Sheet for this information)    Short term and Long term goals: (See Goal Flow Sheet for this information)     Communication ability:  Patient appears to be able to clearly communicate and understand verbal and written communication and follow directions correctly.  Treatment Explanation - The  following has been discussed with the patient:   RX ordered/plan of care  Anticipated outcomes  Possible risks and side effects  This patient would benefit from PT intervention to resume normal activities.   Rehab potential is good.    Frequency:  1 X week, once daily  Duration:  for 8 weeks  Discharge Plan:  Achieve all LTG.  Independent in home treatment program.  Reach maximal therapeutic benefit.      Please refer to the daily flowsheet for treatment today, total treatment time and time spent performing 1:1 timed codes.

## 2018-08-20 NOTE — MR AVS SNAPSHOT
"              After Visit Summary   8/20/2018    Halina Edwards    MRN: [id number]           Patient Information     Date Of Birth          1950        Visit Information        Provider Department      8/20/2018 3:50 PM Dayton Roberts, PT [facility]        Today's Diagnoses     Benign paroxysmal positional vertigo, right    -  1       Follow-ups after your visit        Your next 10 appointments already scheduled     Aug 21, 2018  3:30 PM CDT   (Arrive by 3:15 PM)   JACQUELIN Hand with Dori Limon OT   [facility]    [address]  [address]  [place] [id number] [phone]            Aug 22, 2018  3:50 PM CDT   JACQUELIN Spine with Dayton Roberts PT   [facility] ([facility]  )    [address]  [place] [address] [phone]              Who to contact     If you have questions or need follow up information about today's clinic visit or your schedule please contact [facility] directly at [phone].  Normal or non-critical lab and imaging results will be communicated to you by MyChart, letter or phone within 4 business days after the clinic has received the results. If you do not hear from us within 7 days, please contact the clinic through [facility] or phone. If you have a critical or abnormal lab result, we will notify you by phone as soon as possible.  Submit refill requests through [email or web address] or call your pharmacy and they will forward the refill request to us. Please allow 3 business days for your refill to be completed.          Additional Information About Your Visit        [facility] Information     [email or web address] lets you send messages to your doctor, view your test results, renew your prescriptions, schedule appointments and more. To sign up, go to [email or web address] . Click on \"Log in\" on the left " "side of the screen, which will take you to the Welcome page. Then click on \"Sign up Now\" on the right side of the page.     You will be asked to enter the access code listed below, as well as some personal information. Please follow the directions to create your username and password.     Your access code is: [email or web address]  Expires: 2018  5:55 PM     Your access code will  in 90 days. If you need help or a new code, please call your [facility] clinic or [phone].        Care EveryWhere ID     This is your Care EveryWhere ID. This could be used by other organizations to access your [facility] medical records  [id number]        Your Vitals Were     Last Period                   09/10/2005            Blood Pressure from Last 3 Encounters:   09/08/10 131/77   06/10/10 151/82   04/01/10 145/81    Weight from Last 3 Encounters:   05 109.2 kg (240 lb 12 oz)   09/15/05 109.8 kg (242 lb)   05 108.4 kg (239 lb 0.6 oz)              We Performed the Following     CANALITH REPOSITIONING, PER DAY     HC PT EVAL, LOW COMPLEXITY     JACQUELIN INITIAL EVAL REPORT        Primary Care Provider Office Phone # Fax #    Halina Arias PA-C [phone]       [address] [facility] [address]        Equal Access to Services     GURPREET BACON : Hadii aad ku hadasho Soomaali, waaxda luqadaha, qaybta kaalmada baylee, braulio luque . So [facility] [phone].    ATENCIÓN: Si habla español, tiene a brewer disposición servicios gratuitos de asistencia lingüística. Jovan al [phone].    We comply with applicable federal civil rights laws and [place] laws. We do not discriminate on the basis of race, color, national origin, age, disability, sex, sexual orientation, or gender identity.            Thank you!     Thank you for choosing [facility]  for your care. Our goal is always to provide you with excellent care. Hearing back from our " patients is one way we can continue to improve our services. Please take a few minutes to complete the written survey that you may receive in the mail after your visit with us. Thank you!             Your Updated Medication List - Protect others around you: Learn how to safely use, store and throw away your medicines at www.disposemymeds.org.          This list is accurate as of 8/20/18  4:28 PM.  Always use your most recent med list.                   Brand Name Dispense Instructions for use Diagnosis    azithromycin 250 MG tablet    ZITHROMAX    6 Tab    take 1 Tab by mouth daily. two tablets first day and 1 tablet daily for 4 days    Maxillary sinusitis       benzonatate 200 MG capsule    TESSALON    20 Cap    take 1 Cap by mouth 3 times daily as needed for cough.    Maxillary sinusitis       metFORMIN 500 MG tablet    GLUCOPHAGE     2 tablets twice daily        METOPROLOL TARTRATE      None Entered        NOVOFINE 31 31G X 6 MM   Generic drug:  insulin pen needle     100    use as directed        NovoLOG FLEXpen 100 UNITS/ML injection   Generic drug:  insulin aspart     15    as directed with each of her 3 meals as of 3/10/2005 11 units each meal)        PRAVACHOL PO      None Entered        RAMIPRIL PO      None Entered

## 2018-08-21 ENCOUNTER — THERAPY VISIT (OUTPATIENT)
Dept: OCCUPATIONAL THERAPY | Facility: CLINIC | Age: 68
End: 2018-08-21
Payer: COMMERCIAL

## 2018-08-21 DIAGNOSIS — M79.645 PAIN OF FINGER OF LEFT HAND: Primary | ICD-10-CM

## 2018-08-21 PROCEDURE — 97110 THERAPEUTIC EXERCISES: CPT | Mod: GO | Performed by: OCCUPATIONAL THERAPIST

## 2018-08-21 PROCEDURE — 97760 ORTHOTIC MGMT&TRAING 1ST ENC: CPT | Mod: GO | Performed by: OCCUPATIONAL THERAPIST

## 2018-08-21 PROCEDURE — 97165 OT EVAL LOW COMPLEX 30 MIN: CPT | Mod: GO | Performed by: OCCUPATIONAL THERAPIST

## 2018-08-21 PROCEDURE — 97140 MANUAL THERAPY 1/> REGIONS: CPT | Mod: GO | Performed by: OCCUPATIONAL THERAPIST

## 2018-08-21 NOTE — PROGRESS NOTES
Hand Therapy Initial Evaluation    Current Date:  8/21/2018    Diagonsis: Finger swelling  DOI: Couple weeks ago  MD order: 8/15/18    Subjective:  Halina Edwards is a 67 year old R hand dominant female.    Patient reports symptoms of pain, stiffness/loss of motion, weakness/loss of strength and edema of the left Middle finger which occurred due to Unsure, gradual onset over time. Since onset symptoms are Unchanged  Special tests:  x-ray.  Previous treatment: None.    General health as reported by patient is good.  Pertinent medical history includes:Concussions/Dizziness, Depression, Diabetes, High Blood Pressure, Overweight  Medical allergies:Penicillin.  Surgical history: none.  Medication history: Anti-depressants, High Blood Pressure.    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  Job Tasks: Computer Work, Driving  Prior functional level:  no limitations  Barriers include:none  Mobility: difficulty walking  Transportation: drives  Leisure activities/hobbies: [place], regular household chores    Functional Outcome Measure:   Upper Extremity Functional Index Score:  SCORE:   Column Totals: /80: 31   (A lower score indicates greater disability.)    Objective:  Pain Level Report  VAS(0-10) 8/21/2018   At Rest: 4/10   With Use: 7/10     Report of Pain:  Location:  Palmar and dorsal Metacarpal of MF, ellen over A1  Pain Quality:  Sharp and Throbbing  Frequency: constant    Pain is worst:  daytime or nighttime, wakes 2x per week  Exacerbated by:  Bumping it, gripping  Relieved by:  none  Progression:  Unchanged  Edema:  Edema:  Circumference (measured in cm)    Middle Finger  Date 8/21/2018 8/21/2018   Side L R   P1 6.6 5.8   IP 6.6 6.0   P2       Sensation: WNL throughout all nerve distributions; per patient report    ROM:  Pain Report:  - none    + mild    ++ moderate    +++ severe   Middle Finger 8/21/2018 8/21/2018    AROM(PROM) L MF R MF   MCP 0/55 0/77   PIP 0/83 0/90   DIP 0/40 0/52     Stage of Stenosing Tenosynovitis (SST):   8/21/2018   Triggering of Middle finger Normal   Stage 1:  Normal  Stage 2:  Uneven motion of tendon  Stage 3:  Triggering, clicking, catching  Stage 4:  Locking in extension or flexion; unlocked by active motion  Stage 5:  Locking in extension or flexion; unlocked by passive motion  Stage 6:  Finger locked in extension or flexion    Palpation:   8/21/2018   A1 pulley Middle finger 5-6/10 with light palpation  No catching noted       Sensation:  WNL throughout all nerve distributions; per patient report    STRENGTH: (Measured in pounds, pain scale 0-10/10)   (Pain Free)  Date 8/21/2018        Trials Left Right Left Right Left Right Left Right Left Right Left Right   1 20 50             2               3               Avg               Pain                 3 Point Pinch (Pain free)  Date 8/21/2018        Trials Left Right Left Right Left Right Left Right Left Right Left Right   1 2 9             2               3               Avg               Pain                     Assessment:  Patient presents with symptoms consistent with diagnosis of Middle finger pain and swelling,  with conservative intervention.     Patient's limitations or Problem List includes:  Pain, Decreased ROM/motion, Increased edema, Weakness, Decreased , Decreased pinch and Tightness in musculature of the left long finger which interferes with the patient's ability to perform Self Care Tasks (dressing, eating, bathing), Work Tasks, Sleep Patterns, Recreational Activities, Household Chores and Driving  as compared to previous level of function.    Rehab Potential:  Excellent - Return to full activity, no limitations    Patient will benefit from skilled Occupational Therapy to increase ROM,  strength and pinch strength and decrease pain and edema to return to previous activity level and resume normal daily tasks and to  reach their rehab potential.    Barriers to Learning:  No barrier    Communication Issues:  Patient appears to be able to clearly communicate and understand verbal and written communication and follow directions correctly.    Chart Review: Chart Review, Brief history including review of medical and/or therapy records relating to the presenting problem and Simple history review with patient    Identified Performance Deficits: bathing/showering, dressing, driving and community mobility, health management and maintenance, home establishment and management, meal preparation and cleanup, shopping, sleep, work and leisure activities    Assessment of Occupational Performance:  5 or more Performance Deficits    Clinical Decision Making (Complexity): Low complexity    Treatment Explanation:  The following has been discussed with the patient:  RX ordered/plan of care  Anticipated outcomes  Possible risks and side effects    Plan:  Frequency:  1 X week, once daily  Duration:  for 6 weeks    Treatment Plan:    Modalities:  US  Therapeutic Exercise:  PROM, Tendon Gliding and Blocking  Neuromuscular re-education:  Nerve Gliding and Kinesiotaping  Manual Techniques:  Friction massage, Myofascial release and Manual edema mobilization  Orthotic Fabrication:  Static orthosis    Discharge Plan:  Achieve all LTG.  Independent in home treatment program.  Reach maximal therapeutic benefit.    Home Exercise Program:  Warmth for stiffness  Ice to A1 pulley/palm for inflammation  Decongestive and TFM to palm and A1 pulley  AROM fingers E/F, avoid triggering  PROM finger flexion  MP Flexion block orthosis sleeping  Oval 8 or ring orthosis day as needed to prevent triggering      Next Visit:  Massage to A1  Possible K tape  PROM  Check splint fit

## 2018-08-21 NOTE — LETTER
[facility]  [address]  [address]  [facility] [address]  [phone]    2018    Re: Halina Edwards   :   1950  MRN:  [id number]   REFERRING PHYSICIAN:   Edelmira Castillo    [facility]  Date of Initial Evaluation:  18  Visits:  Rxs Used: 1  Reason for Referral:  Pain of finger of left hand    EVALUATION SUMMARY    Hand Therapy Initial Evaluation    Current Date:  2018  Diagonsis: Finger swelling  DOI: Couple weeks ago  MD order: 8/15/18    Subjective:  Halina Edwards is a 67 year old R hand dominant female.    Patient reports symptoms of pain, stiffness/loss of motion, weakness/loss of strength and edema of the left Middle finger which occurred due to Unsure, gradual onset over time. Since onset symptoms are Unchanged  Special tests:  x-ray.  Previous treatment: None.    General health as reported by patient is good.  Pertinent medical history includes:Concussions/Dizziness, Depression, Diabetes, High Blood Pressure, Overweight  Medical allergies:Penicillin.  Surgical history: none.  Medication history: Anti-depressants, High Blood Pressure.    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  Job Tasks: Computer Work, Driving  Prior functional level:  no limitations  Barriers include:none  Mobility: difficulty walking  Transportation: drives  Leisure activities/hobbies: [place], [employer], regular household chores    Functional Outcome Measure:   Upper Extremity Functional Index Score:  SCORE:   Column Totals: /80: 31   (A lower score indicates greater disability.)  Objective:  Pain Level Report  VAS(0-10) 2018   At Rest: 10   With Use: 7/10     Report of Pain:  Location:  Palmar and dorsal Metacarpal of MF, ellen over A1  Pain Quality:  Sharp and Throbbing  Frequency: constant    Pain is worst:  daytime or nighttime, wakes 2x per week  Exacerbated by:  Bumping it,  gripping  Relieved by:  none  Progression:  Unchanged  Edema:  Edema:  Circumference (measured in cm)    Middle Finger  Date 8/21/2018 8/21/2018   Side L R   P1 6.6 5.8   IP 6.6 6.0   P2     Sensation: WNL throughout all nerve distributions; per patient report    ROM:  Pain Report:  - none    + mild    ++ moderate    +++ severe   Middle Finger 8/21/2018 8/21/2018   AROM(PROM) L MF R MF   MCP 0/55 0/77   PIP 0/83 0/90   DIP 0/40 0/52     Stage of Stenosing Tenosynovitis (SST):   8/21/2018   Triggering of Middle finger Normal   Stage 1:  Normal  Stage 2:  Uneven motion of tendon  Stage 3:  Triggering, clicking, catching  Stage 4:  Locking in extension or flexion; unlocked by active motion  Stage 5:  Locking in extension or flexion; unlocked by passive motion  Stage 6:  Finger locked in extension or flexion    Palpation:   8/21/2018   A1 pulley Middle finger 5-6/10 with light palpation  No catching noted       Sensation:  WNL throughout all nerve distributions; per patient report    STRENGTH: (Measured in pounds, pain scale 0-10/10)   (Pain Free)  Date 8/21/2018        Trials Left Right Left Right Left Right Left Right Left Right Left Right   1 20 50             2               3               Avg               Pain                 3 Point Pinch (Pain free)  Date 8/21/2018        Trials Left Right Left Right Left Right Left Right Left Right Left Right   1 2 9             2               3               Avg               Pain                 Assessment:  Patient presents with symptoms consistent with diagnosis of Middle finger pain and swelling,  with conservative intervention.     Patient's limitations or Problem List includes:  Pain, Decreased ROM/motion, Increased edema, Weakness, Decreased , Decreased pinch and Tightness in musculature of the left long finger which interferes with the patient's ability to perform Self Care Tasks (dressing, eating, bathing), Work Tasks, Sleep Patterns, Recreational  Activities, Household Chores and Driving  as compared to previous level of function.    Rehab Potential:  Excellent - Return to full activity, no limitations    Patient will benefit from skilled Occupational Therapy to increase ROM,  strength and pinch strength and decrease pain and edema to return to previous activity level and resume normal daily tasks and to reach their rehab potential.    Barriers to Learning:  No barrier    Communication Issues:  Patient appears to be able to clearly communicate and understand verbal and written communication and follow directions correctly.    Chart Review: Chart Review, Brief history including review of medical and/or therapy records relating to the presenting problem and Simple history review with patient    Identified Performance Deficits: bathing/showering, dressing, driving and community mobility, health management and maintenance, home establishment and management, meal preparation and cleanup, shopping, sleep, work and leisure activities    Assessment of Occupational Performance:  5 or more Performance Deficits    Clinical Decision Making (Complexity): Low complexity        Treatment Explanation:  The following has been discussed with the patient:  RX ordered/plan of care  Anticipated outcomes  Possible risks and side effects    Plan:  Frequency:  1 X week, once daily  Duration:  for 6 weeks    Treatment Plan:    Modalities:  US  Therapeutic Exercise:  PROM, Tendon Gliding and Blocking  Neuromuscular re-education:  Nerve Gliding and Kinesiotaping  Manual Techniques:  Friction massage, Myofascial release and Manual edema mobilization  Orthotic Fabrication:  Static orthosis    Discharge Plan:  Achieve all LTG.  Independent in home treatment program.  Reach maximal therapeutic benefit.    Home Exercise Program:  Warmth for stiffness  Ice to A1 pulley/palm for inflammation  Decongestive and TFM to palm and A1 pulley  AROM fingers E/F, avoid triggering  PROM finger  flexion  MP Flexion block orthosis sleeping  Oval 8 or ring orthosis day as needed to prevent triggering    Next Visit:  Massage to A1  Possible K tape  PROM  Check splint fit    Thank you for your referral.    INQUIRIES  Therapist: VONNIE Montoya/PHILIPP, CHT  [facility]  [address]  [facility] [address]  [facility] [address]  Phone: [phone]  Fax: [phone]

## 2018-08-21 NOTE — MR AVS SNAPSHOT
After Visit Summary   8/21/2018    Halina Edwards    MRN: [id number]           Patient Information     Date Of Birth          1950        Visit Information        Provider Department      8/21/2018 3:30 PM Dori Limon OT [facility]        Today's Diagnoses     Pain of finger of left hand    -  1       Follow-ups after your visit        Your next 10 appointments already scheduled     Aug 28, 2018  1:30 PM CDT   JACQUELIN Hand with Dori Limon OT   [facility] ([facility])    [address]  [address]  [place] [address]   [phone]            Aug 29, 2018  3:50 PM CDT   JACQUELIN Spine with Dayton Roberts PT   [facility] ([facility]  )    [address]  [facility] [address] [phone]            Sep 04, 2018  3:00 PM CDT   JACQUELIN Hand with Dori Limon OT   [facility] ([facility])    [address]  [address]  [place] [address] [phone]              Who to contact     If you have questions or need follow up information about today's clinic visit or your schedule please contact [facility] directly at [phone].  Normal or non-critical lab and imaging results will be communicated to you by Verdande Technologyhart, letter or phone within 4 business days after the clinic has received the results. If you do not hear from us within 7 days, please contact the clinic through Verdande Technologyhart or phone. If you have a critical or abnormal lab result, we will notify you by phone as soon as possible.  Submit refill requests through [employer] or call your pharmacy and they will forward the refill request to us. Please allow 3 business days for your refill to be completed.          Additional Information About Your Visit        Verdande Technology[employer] Information     [employer] lets you send messages to your doctor, view your test results, renew your prescriptions, schedule appointments and  "more. To sign up, go to [email or web address] . Click on \"Log in\" on the left side of the screen, which will take you to the Welcome page. Then click on \"Sign up Now\" on the right side of the page.     You will be asked to enter the access code listed below, as well as some personal information. Please follow the directions to create your username and password.     Your access code is: [email or web address]  Expires: 2018  5:55 PM     Your access code will  in 90 days. If you need help or a new code, please call your [facility] clinic or [phone].        Care EveryWhere ID     This is your Care EveryWhere ID. This could be used by other organizations to access your [facility] medical records  [id number]        Your Vitals Were     Last Period                   09/10/2005            Blood Pressure from Last 3 Encounters:   09/08/10 131/77   06/10/10 151/82   04/01/10 145/81    Weight from Last 3 Encounters:   05 109.2 kg (240 lb 12 oz)   09/15/05 109.8 kg (242 lb)   05 108.4 kg (239 lb 0.6 oz)              We Performed the Following     HC OT EVAL, LOW COMPLEXITY     JACQUELIN INITIAL EVAL REPORT     MANUAL THER TECH,1+REGIONS,EA 15 MIN     ORTHOTIC MGMT AND TRAINING, EACH 15 MIN     THERAPEUTIC EXERCISES        Primary Care Provider Office Phone # Fax #    Halina Arias PA-C [phone]       [address] [place] [address]        Equal Access to Services     GURPREET BACON : Hadii sarita ku hadasho Soomaali, waaxda luqadaha, qaybta kaalmada ademargueriteyabasilio, braulio luque . So [facility] [phone].    ATENCIÓN: Si habla español, tiene a brewer disposición servicios gratuitos de asistencia lingüística. Llame al [phone].    We comply with applicable federal civil rights laws and [place] laws. We do not discriminate on the basis of race, color, national origin, age, disability, sex, sexual orientation, or gender identity.            Thank you!     Thank you for choosing " [facility]  for your care. Our goal is always to provide you with excellent care. Hearing back from our patients is one way we can continue to improve our services. Please take a few minutes to complete the written survey that you may receive in the mail after your visit with us. Thank you!             Your Updated Medication List - Protect others around you: Learn how to safely use, store and throw away your medicines at www.disposemymeds.org.          This list is accurate as of 8/21/18  6:15 PM.  Always use your most recent med list.                   Brand Name Dispense Instructions for use Diagnosis    azithromycin 250 MG tablet    ZITHROMAX    6 Tab    take 1 Tab by mouth daily. two tablets first day and 1 tablet daily for 4 days    Maxillary sinusitis       benzonatate 200 MG capsule    TESSALON    20 Cap    take 1 Cap by mouth 3 times daily as needed for cough.    Maxillary sinusitis       metFORMIN 500 MG tablet    GLUCOPHAGE     2 tablets twice daily        METOPROLOL TARTRATE      None Entered        NOVOFINE 31 31G X 6 MM   Generic drug:  insulin pen needle     100    use as directed        NovoLOG FLEXpen 100 UNITS/ML injection   Generic drug:  insulin aspart     15    as directed with each of her 3 meals as of 3/10/2005 11 units each meal)        PRAVACHOL PO      None Entered        RAMIPRIL PO      None Entered

## 2018-08-28 ENCOUNTER — THERAPY VISIT (OUTPATIENT)
Dept: OCCUPATIONAL THERAPY | Facility: CLINIC | Age: 68
End: 2018-08-28
Payer: COMMERCIAL

## 2018-08-28 DIAGNOSIS — M79.645 PAIN OF FINGER OF LEFT HAND: ICD-10-CM

## 2018-08-28 PROCEDURE — 97035 APP MDLTY 1+ULTRASOUND EA 15: CPT | Mod: GO | Performed by: OCCUPATIONAL THERAPIST

## 2018-08-28 PROCEDURE — 97140 MANUAL THERAPY 1/> REGIONS: CPT | Mod: GO | Performed by: OCCUPATIONAL THERAPIST

## 2018-08-28 PROCEDURE — 97110 THERAPEUTIC EXERCISES: CPT | Mod: GO | Performed by: OCCUPATIONAL THERAPIST

## 2018-08-28 NOTE — PROGRESS NOTES
Objective info 8/28/18  Middle Finger  Date 8/21/2018 8/21/2018 8/28/18   Side L R L   P1 6.6 5.8 6.4   IP 6.6 6.0 6.0   P2        Please refer to the daily flowsheet for treatment today, total treatment time and time spent performing 1:1 timed codes.

## 2018-08-28 NOTE — MR AVS SNAPSHOT
"              After Visit Summary   8/28/2018    Halina Edwards    MRN: [id number]           Patient Information     Date Of Birth          1950        Visit Information        Provider Department      8/28/2018 1:30 PM Dori Limon, [facility]        Today's Diagnoses     Pain of finger of left hand           Follow-ups after your visit        Your next 10 appointments already scheduled     Aug 29, 2018  3:50 PM CDT   JACQUELIN Spine with Dayton Roberts, PT   [facility] ([facility]  )    [address]  [facility] [id number] [phone]            Sep 04, 2018  3:00 PM CDT   JACQUELIN Hand with Dori Limon OT   [facility] ([facility])    [address]  [address]  [place] [address] [phone]              Who to contact     If you have questions or need follow up information about today's clinic visit or your schedule please contact [facility] directly at [phone].  Normal or non-critical lab and imaging results will be communicated to you by eduPadhart, letter or phone within 4 business days after the clinic has received the results. If you do not hear from us within 7 days, please contact the clinic through eduPadhart or phone. If you have a critical or abnormal lab result, we will notify you by phone as soon as possible.  Submit refill requests through [employer] or call your pharmacy and they will forward the refill request to us. Please allow 3 business days for your refill to be completed.          Additional Information About Your Visit        eduPadhart Information     Copious lets you send messages to your doctor, view your test results, renew your prescriptions, schedule appointments and more. To sign up, go to [email or web address] . Click on \"Log in\" on the left side of the screen, which will take you to the Welcome page. Then click on \"Sign up Now\" on the right side of the " page.     You will be asked to enter the access code listed below, as well as some personal information. Please follow the directions to create your username and password.     Your access code is: [email or web address]  Expires: 2018  5:55 PM     Your access code will  in 90 days. If you need help or a new code, please call your [facility] clinic or [phone].        Care EveryWhere ID     This is your Care EveryWhere ID. This could be used by other organizations to access your [facility] medical records  [id number]        Your Vitals Were     Last Period                   09/10/2005            Blood Pressure from Last 3 Encounters:   09/08/10 131/77   06/10/10 151/82   04/01/10 145/81    Weight from Last 3 Encounters:   05 109.2 kg (240 lb 12 oz)   09/15/05 109.8 kg (242 lb)   05 108.4 kg (239 lb 0.6 oz)              We Performed the Following     MANUAL THER TECH,1+REGIONS,EA 15 MIN     THERAPEUTIC EXERCISES     ULTRASOUND THERAPY        Primary Care Provider Office Phone # Fax #    Halina Arias PA-C [phone]       [address] [place] [address]        Equal Access to Services     GURPREET BACON : Hadii aad ku hadasho Soomaali, waaxda luqadaha, qaybta kaalmada adeegyada, braulio luque . So [facility] [phone].    ATENCIÓN: Si habla español, tiene a brewer disposición servicios gratuitos de asistencia lingüística. Llame al [phone].    We comply with applicable federal civil rights laws and [place] laws. We do not discriminate on the basis of race, color, national origin, age, disability, sex, sexual orientation, or gender identity.            Thank you!     Thank you for choosing [facility]  for your care. Our goal is always to provide you with excellent care. Hearing back from our patients is one way we can continue to improve our services. Please take a few minutes to complete the written survey that you may receive in the mail after  your visit with us. Thank you!             Your Updated Medication List - Protect others around you: Learn how to safely use, store and throw away your medicines at www.disposemymeds.org.          This list is accurate as of 8/28/18  2:05 PM.  Always use your most recent med list.                   Brand Name Dispense Instructions for use Diagnosis    azithromycin 250 MG tablet    ZITHROMAX    6 Tab    take 1 Tab by mouth daily. two tablets first day and 1 tablet daily for 4 days    Maxillary sinusitis       benzonatate 200 MG capsule    TESSALON    20 Cap    take 1 Cap by mouth 3 times daily as needed for cough.    Maxillary sinusitis       metFORMIN 500 MG tablet    GLUCOPHAGE     2 tablets twice daily        METOPROLOL TARTRATE      None Entered        NOVOFINE 31 31G X 6 MM   Generic drug:  insulin pen needle     100    use as directed        NovoLOG FLEXpen 100 UNITS/ML injection   Generic drug:  insulin aspart     15    as directed with each of her 3 meals as of 3/10/2005 11 units each meal)        PRAVACHOL PO      None Entered        RAMIPRIL PO      None Entered

## 2018-09-04 ENCOUNTER — THERAPY VISIT (OUTPATIENT)
Dept: OCCUPATIONAL THERAPY | Facility: CLINIC | Age: 68
End: 2018-09-04
Payer: COMMERCIAL

## 2018-09-04 DIAGNOSIS — M79.645 PAIN OF FINGER OF LEFT HAND: ICD-10-CM

## 2018-09-04 PROCEDURE — 97035 APP MDLTY 1+ULTRASOUND EA 15: CPT | Mod: GO | Performed by: OCCUPATIONAL THERAPIST

## 2018-09-04 PROCEDURE — 97110 THERAPEUTIC EXERCISES: CPT | Mod: GO | Performed by: OCCUPATIONAL THERAPIST

## 2018-09-04 PROCEDURE — 97140 MANUAL THERAPY 1/> REGIONS: CPT | Mod: GO | Performed by: OCCUPATIONAL THERAPIST

## 2018-09-04 NOTE — MR AVS SNAPSHOT
"              After Visit Summary   9/4/2018    Halina Edwards    MRN: [id number]           Patient Information     Date Of Birth          1950        Visit Information        Provider Department      9/4/2018 3:00 PM Dori Limon, OT [facility]        Today's Diagnoses     Pain of finger of left hand           Follow-ups after your visit        Your next 10 appointments already scheduled     Sep 11, 2018  3:00 PM CDT   JACQUELIN Hand with Dori Limon, OT   [facility] ([facility])    [address]  [address]  [place] [address] [phone]            Sep 18, 2018  4:00 PM CDT   JACQUELIN Hand with Dori Limon, OT   [facility] ([facility])    [address]  [address]  [place] [address] [phone]              Who to contact     If you have questions or need follow up information about today's clinic visit or your schedule please contact [facility] directly at [phone].  Normal or non-critical lab and imaging results will be communicated to you by ClrTouchhart, letter or phone within 4 business days after the clinic has received the results. If you do not hear from us within 7 days, please contact the clinic through Stublishert or phone. If you have a critical or abnormal lab result, we will notify you by phone as soon as possible.  Submit refill requests through [employer] or call your pharmacy and they will forward the refill request to us. Please allow 3 business days for your refill to be completed.          Additional Information About Your Visit        ClrTouchharwatAgame Information     [employer] lets you send messages to your doctor, view your test results, renew your prescriptions, schedule appointments and more. To sign up, go to [email or web address] . Click on \"Log in\" on the left side of the screen, which will take you to the Welcome page. Then click on \"Sign up Now\" on the right side of the page.     You will be asked to " enter the access code listed below, as well as some personal information. Please follow the directions to create your username and password.     Your access code is: [email or web address]  Expires: 2018  5:55 PM     Your access code will  in 90 days. If you need help or a new code, please call your [facility] clinic or [phone].        Care EveryWhere ID     This is your Care EveryWhere ID. This could be used by other organizations to access your [facility] medical records  [id number]        Your Vitals Were     Last Period                   09/10/2005            Blood Pressure from Last 3 Encounters:   09/08/10 131/77   06/10/10 151/82   04/01/10 145/81    Weight from Last 3 Encounters:   05 109.2 kg (240 lb 12 oz)   09/15/05 109.8 kg (242 lb)   05 108.4 kg (239 lb 0.6 oz)              We Performed the Following     MANUAL THER TECH,1+REGIONS,EA 15 MIN     THERAPEUTIC EXERCISES     ULTRASOUND THERAPY        Primary Care Provider Office Phone # Fax #    Halina Arias PA-C [phone]       [address] [place] [address]        Equal Access to Services     GURPREET BACON : Hadii aad leana hadasho Sogutierrezali, waaxda luqadaha, qaybta kaalmada adeegyada, braulio león. So [facility] [phone].    ATENCIÓN: Si habla español, tiene a brewer disposición servicios gratuitos de asistencia lingüística. Jovan al [phone].    We comply with applicable federal civil rights laws and [place] laws. We do not discriminate on the basis of race, color, national origin, age, disability, sex, sexual orientation, or gender identity.            Thank you!     Thank you for choosing [facility]  for your care. Our goal is always to provide you with excellent care. Hearing back from our patients is one way we can continue to improve our services. Please take a few minutes to complete the written survey that you may receive in the mail after your visit with us. Thank  you!             Your Updated Medication List - Protect others around you: Learn how to safely use, store and throw away your medicines at www.disposemymeds.org.          This list is accurate as of 9/4/18  3:40 PM.  Always use your most recent med list.                   Brand Name Dispense Instructions for use Diagnosis    azithromycin 250 MG tablet    ZITHROMAX    6 Tab    take 1 Tab by mouth daily. two tablets first day and 1 tablet daily for 4 days    Maxillary sinusitis       benzonatate 200 MG capsule    TESSALON    20 Cap    take 1 Cap by mouth 3 times daily as needed for cough.    Maxillary sinusitis       metFORMIN 500 MG tablet    GLUCOPHAGE     2 tablets twice daily        METOPROLOL TARTRATE      None Entered        NOVOFINE 31 31G X 6 MM   Generic drug:  insulin pen needle     100    use as directed        NovoLOG FLEXpen 100 UNITS/ML injection   Generic drug:  insulin aspart     15    as directed with each of her 3 meals as of 3/10/2005 11 units each meal)        PRAVACHOL PO      None Entered        RAMIPRIL PO      None Entered

## 2018-09-18 ENCOUNTER — THERAPY VISIT (OUTPATIENT)
Dept: OCCUPATIONAL THERAPY | Facility: CLINIC | Age: 68
End: 2018-09-18
Payer: COMMERCIAL

## 2018-09-18 DIAGNOSIS — M79.645 PAIN OF FINGER OF LEFT HAND: ICD-10-CM

## 2018-09-18 PROCEDURE — 97140 MANUAL THERAPY 1/> REGIONS: CPT | Mod: GO | Performed by: OCCUPATIONAL THERAPIST

## 2018-09-18 PROCEDURE — 97035 APP MDLTY 1+ULTRASOUND EA 15: CPT | Mod: GO | Performed by: OCCUPATIONAL THERAPIST

## 2018-09-18 PROCEDURE — 97110 THERAPEUTIC EXERCISES: CPT | Mod: GO | Performed by: OCCUPATIONAL THERAPIST

## 2018-09-18 NOTE — MR AVS SNAPSHOT
"              After Visit Summary   2018    Halina Edwards    MRN: [id number]           Patient Information     Date Of Birth          1950        Visit Information        Provider Department      2018 4:00 PM Dori Limon OT [facility]        Today's Diagnoses     Pain of finger of left hand           Follow-ups after your visit        Who to contact     If you have questions or need follow up information about today's clinic visit or your schedule please contact [facility] directly at [phone].  Normal or non-critical lab and imaging results will be communicated to you by Akimbo Financialhart, letter or phone within 4 business days after the clinic has received the results. If you do not hear from us within 7 days, please contact the clinic through WearYouWantt or phone. If you have a critical or abnormal lab result, we will notify you by phone as soon as possible.  Submit refill requests through [employer] or call your pharmacy and they will forward the refill request to us. Please allow 3 business days for your refill to be completed.          Additional Information About Your Visit        MyChart Information     Giga[employer] lets you send messages to your doctor, view your test results, renew your prescriptions, schedule appointments and more. To sign up, go to [email or web address] . Click on \"Log in\" on the left side of the screen, which will take you to the Welcome page. Then click on \"Sign up Now\" on the right side of the page.     You will be asked to enter the access code listed below, as well as some personal information. Please follow the directions to create your username and password.     Your access code is: [email or web address]  Expires: 2018  5:55 PM     Your access code will  in 90 days. If you need help or a new code, please call your [facility] clinic or [phone].        Care EveryWhere ID     This is your Care EveryWhere ID. This could be used by other organizations " to access your [facility] medical records  [id number]        Your Vitals Were     Last Period                   09/10/2005            Blood Pressure from Last 3 Encounters:   09/08/10 131/77   06/10/10 151/82   04/01/10 145/81    Weight from Last 3 Encounters:   09/27/05 109.2 kg (240 lb 12 oz)   09/15/05 109.8 kg (242 lb)   04/07/05 108.4 kg (239 lb 0.6 oz)              We Performed the Following     MANUAL THER TECH,1+REGIONS,EA 15 MIN     THERAPEUTIC EXERCISES     ULTRASOUND THERAPY        Primary Care Provider Office Phone # Fax #    Halina Arias PA-C [phone] [address] [place] [address]        Equal Access to Services     GURPREET BACON : Hadii aad ku hadasho Soomaali, waaxda luqadaha, qaybta kaalmada adeegyada, waxay idiin hayrodger león. So [facility] [phone].    ATENCIÓN: Si habla español, tiene a brewer disposición servicios gratuitos de asistencia lingüística. Ll[facility], [facility] [phone].    We comply with applicable federal civil rights laws and [place] laws. We do not discriminate on the basis of race, color, national origin, age, disability, sex, sexual orientation, or gender identity.            Thank you!     Thank you for choosing [facility]  for your care. Our goal is always to provide you with excellent care. Hearing back from our patients is one way we can continue to improve our services. Please take a few minutes to complete the written survey that you may receive in the mail after your visit with us. Thank you!             Your Updated Medication List - Protect others around you: Learn how to safely use, store and throw away your medicines at www.disposemymeds.org.          This list is accurate as of 9/18/18  4:49 PM.  Always use your most recent med list.                   Brand Name Dispense Instructions for use Diagnosis    azithromycin 250 MG tablet    ZITHROMAX    6 Tab    take 1 Tab by mouth daily. two tablets first day and 1 tablet  daily for 4 days    Maxillary sinusitis       benzonatate 200 MG capsule    TESSALON    20 Cap    take 1 Cap by mouth 3 times daily as needed for cough.    Maxillary sinusitis       metFORMIN 500 MG tablet    GLUCOPHAGE     2 tablets twice daily        METOPROLOL TARTRATE      None Entered        NOVOFINE 31 31G X 6 MM   Generic drug:  insulin pen needle     100    use as directed        NovoLOG FLEXpen 100 UNITS/ML injection   Generic drug:  insulin aspart     15    as directed with each of her 3 meals as of 3/10/2005 11 units each meal)        PRAVACHOL PO      None Entered        RAMIPRIL PO      None Entered

## 2018-11-01 ENCOUNTER — THERAPY VISIT (OUTPATIENT)
Dept: OCCUPATIONAL THERAPY | Facility: CLINIC | Age: 68
End: 2018-11-01
Payer: COMMERCIAL

## 2018-11-01 DIAGNOSIS — M79.645 PAIN OF FINGER OF LEFT HAND: ICD-10-CM

## 2018-11-01 PROCEDURE — 97110 THERAPEUTIC EXERCISES: CPT | Mod: GO | Performed by: OCCUPATIONAL THERAPIST

## 2018-11-01 PROCEDURE — 97760 ORTHOTIC MGMT&TRAING 1ST ENC: CPT | Mod: GO | Performed by: OCCUPATIONAL THERAPIST

## 2018-11-01 NOTE — PROGRESS NOTES
Progress Note - Hand Therapy  Reporting period is 8/21/18 to 11/1/18.  Current Date:  11/1/2018    Diagonsis: Finger swelling  DOI: August 2018  MD order: 8/15/18    Subjectve:   Subjective changes as noted by patient:  Feels like it's worse. Feels like I'm doing the right thing, but its very tender.  Functional changes noted by patient:  Decreased Performance in Sleep Patterns, Recreational Activities and Household Chores  Patient has noted adverse reaction to:  None    Objective:  Changes noted in objective findings: Yes, see below    Objective:  Pain Level Report  VAS(0-10) 8/21/2018 11/1/18   At Rest: 4/10 5/10   With Use: 7/10 10/10     Report of Pain:  Location:  Palmar and dorsal Metacarpal of MF, ellen over A1  Pain Quality:  Sharp and Throbbing  Frequency: constant    Pain is worst:  daytime or nighttime, wakes 2x per week  Exacerbated by:  Bumping it, gripping  Relieved by:  none  Progression:  Unchanged  Edema:  Edema:  Circumference (measured in cm)    Middle Finger  Date 8/21/2018 8/21/2018 8/28/18 9/4/18 9/18/18 11/1/18   Side L R L L L L   P1 6.6 5.8 6.4 6.2 6.2 6.5   IP 6.6 6.0 6.0 WNL  6.1   P2           Sensation: WNL throughout all nerve distributions; per patient report    ROM:  Pain Report:  - none    + mild    ++ moderate    +++ severe   Middle Finger 8/21/2018 8/21/2018 11/1/18   AROM(PROM) L MF R MF L MF   MCP 0/55 0/77 /45   PIP 0/83 0/90 /60   DIP 0/40 0/52 /20     Stage of Stenosing Tenosynovitis (SST):   8/21/2018 11/1/18   Triggering of Middle finger NO triggering Stage 2-3   Stage 1:  Normal  Stage 2:  Uneven motion of tendon  Stage 3:  Triggering, clicking, catching  Stage 4:  Locking in extension or flexion; unlocked by active motion  Stage 5:  Locking in extension or flexion; unlocked by passive motion  Stage 6:  Finger locked in extension or flexion    Palpation:   8/21/2018 11/1/18   A1 pulley Middle finger Normal motion   Clicking noted upon palpation     STRENGTH: (Measured in  pounds, pain scale 0-10/10)  11/1/18: not tested due to pain   (Pain Free)  Date 8/21/2018        Trials Left Right Left Right Left Right Left Right Left Right Left Right   1 20 50             2               3               Avg               Pain                 3 Point Pinch (Pain free)  Date 8/21/2018        Trials Left Right Left Right Left Right Left Right Left Right Left Right   1 2 9             2               3               Avg               Pain                 Assessment:  Response to therapy has been lack of progress in:  ROM of Fingers: All Planes  Strength:   and pinch  Edema:  increased  Pain:  frequency is more, intensity of pain has increased and more tender over affected area  Self Care Skills:  Increased difficulty gripping and completing ADLs  Response to therapy has been decline in:  ROM of Fingers: All Planes  Pain:  frequency is more and intensity of pain has increased    Overall Assessment:  Patient has experienced an exacerbation of symptoms.  Patient would benefit from continued therapy to achieve rehab potential  STG/LTG:  STGoals have been reviewed;  see goal sheet for details and updates.  LTGoals have been reviewed;  see goal sheet for details and updates.    I have re-evaluated this patient and find that the nature, scope, duration and intensity of the therapy is appropriate for the medical condition of the patient.    Plan:  Frequency:  1 X week, once daily  Duration:  for 6 weeks (total 12 visits)    Treatment Plan:    Modalities:  US  Therapeutic Exercise:  PROM, Tendon Gliding and Blocking  Neuromuscular re-education:  Nerve Gliding and Kinesiotaping  Manual Techniques:  Friction massage, Myofascial release and Manual edema mobilization  Orthotic Fabrication:  Static orthosis    Discharge Plan:  Achieve all LTG.  Independent in home treatment program.  Reach maximal therapeutic benefit.    Home Exercise Program:  Warmth for stiffness  Ice to A1 pulley/palm for  inflammation  Decongestive and TFM to palm and A1 pulley  AROM fingers E/F, avoid triggering  PROM finger flexion  MP Flexion block orthosis sleeping  Oval 8 or ring orthosis day as needed to prevent triggering  Hand based splint for MF extension at night    Next Visit:  Massage to A1  PROM  US  Check splint fit

## 2018-11-01 NOTE — MR AVS SNAPSHOT
"              After Visit Summary   11/1/2018    Halina Edwards    MRN: [id number]           Patient Information     Date Of Birth          1950        Visit Information        Provider Department      11/1/2018 1:00 PM Dori Limon, OT [facility]        Today's Diagnoses     Pain of finger of left hand           Follow-ups after your visit        Your next 10 appointments already scheduled     Nov 08, 2018  4:30 PM CST   JACQUELIN Hand with Dori Limon, OT   [facility]    [address]  [address]  [place] [address] [phone]            Nov 15, 2018  3:30 PM CST   JACQUELIN Hand with Dori Limon, OT   [facility]    [address]  [address]  [place] [address] [phone]              Who to contact     If you have questions or need follow up information about today's clinic visit or your schedule please contact [facility] directly at [phone].  Normal or non-critical lab and imaging results will be communicated to you by [employer], letter or phone within 4 business days after the clinic has received the results. If you do not hear from us within 7 days, please contact the clinic through [employer] or phone. If you have a critical or abnormal lab result, we will notify you by phone as soon as possible.  Submit refill requests through eTect or call your pharmacy and they will forward the refill request to us. Please allow 3 business days for your refill to be completed.          Additional Information About Your Visit        [employer]Me[employer]ob Information     eTect lets you send messages to your doctor, view your test results, renew your prescriptions, schedule appointments and more. To sign up, go to [email or web address] . Click on \"Log in\" on the left side of the screen, which will take you to the Welcome page. Then click on \"Sign up Now\" on the right side of the page.     You will be asked " to enter the access code listed below, as well as some personal information. Please follow the directions to create your username and password.     Your access code is: [email or web address]  Expires: 2018  5:55 PM     Your access code will  in 90 days. If you need help or a new code, please call your [facility] clinic or [phone].        Care EveryWhere ID     This is your Care EveryWhere ID. This could be used by other organizations to access your [facility] medical records  [id number]        Your Vitals Were     Last Period                   09/10/2005            Blood Pressure from Last 3 Encounters:   09/08/10 131/77   06/10/10 151/82   04/01/10 145/81    Weight from Last 3 Encounters:   05 109.2 kg (240 lb 12 oz)   09/15/05 109.8 kg (242 lb)   05 108.4 kg (239 lb 0.6 oz)              We Performed the Following     JACQUELIN PROGRESS NOTES REPORT     ORTHOTIC MGMT AND TRAINING, EACH 15 MIN     THERAPEUTIC EXERCISES        Primary Care Provider Office Phone # Fax #    Halina Arias PA-C [phone]       [address] [place] [address]        Equal Access to Services     GURPREET BACON : Hadii aad ku hadasho Soomaali, waaxda luqadaha, qaybta kaalmada adeegyada, braulio león. So [facility] [phone].    ATENCIÓN: Si habla español, tiene a brewer disposición servicios gratuitos de asistencia lingüística. Llame al [phone].    We comply with applicable federal civil rights laws and [place] laws. We do not discriminate on the basis of race, color, national origin, age, disability, sex, sexual orientation, or gender identity.            Thank you!     Thank you for choosing [facility]  for your care. Our goal is always to provide you with excellent care. Hearing back from our patients is one way we can continue to improve our services. Please take a few minutes to complete the written survey that you may receive in the mail after your visit with  us. Thank you!             Your Updated Medication List - Protect others around you: Learn how to safely use, store and throw away your medicines at www.disposemymeds.org.          This list is accurate as of 11/1/18  3:10 PM.  Always use your most recent med list.                   Brand Name Dispense Instructions for use Diagnosis    azithromycin 250 MG tablet    ZITHROMAX    6 Tab    take 1 Tab by mouth daily. two tablets first day and 1 tablet daily for 4 days    Maxillary sinusitis       benzonatate 200 MG capsule    TESSALON    20 Cap    take 1 Cap by mouth 3 times daily as needed for cough.    Maxillary sinusitis       metFORMIN 500 MG tablet    GLUCOPHAGE     2 tablets twice daily        METOPROLOL TARTRATE      None Entered        NOVOFINE 31 31G X 6 MM   Generic drug:  insulin pen needle     100    use as directed        NovoLOG FLEXpen 100 UNITS/ML injection   Generic drug:  insulin aspart     15    as directed with each of her 3 meals as of 3/10/2005 11 units each meal)        PRAVACHOL PO      None Entered        RAMIPRIL PO      None Entered

## 2018-11-01 NOTE — LETTER
[facility]  [address]  [address]  [place] [address]  [phone]    2018    Re: Halina Edwards   :   1950  MRN:  [id number]   REFERRING PHYSICIAN:   Edelmira Castillo    [facility]  Date of Initial Evaluation: 18  Visits:  Rxs Used: 5  Reason for Referral:  Pain of finger of left hand    EVALUATION SUMMARY    Progress Note - Hand Therapy  Reporting period is 18 to 18.  Current Date:  2018    Diagonsis: Finger swelling  DOI: 2018  MD order: 8/15/18    Subjectve:   Subjective changes as noted by patient:  Feels like it's worse. Feels like I'm doing the right thing, but its very tender.  Functional changes noted by patient:  Decreased Performance in Sleep Patterns, Recreational Activities and Household Chores  Patient has noted adverse reaction to:  None    Objective:  Changes noted in objective findings: Yes, see below    Objective:  Pain Level Report  VAS(0-10) 2018   At Rest: 410 5/10   With Use: 7/10 10/10     Report of Pain:  Location:  Palmar and dorsal Metacarpal of MF, ellen over A1  Pain Quality:  Sharp and Throbbing  Frequency: constant    Pain is worst:  daytime or nighttime, wakes 2x per week  Exacerbated by:  Bumping it, gripping  Relieved by:  none  Progression:  Unchanged  Edema:  Edema:  Circumference (measured in cm)    Middle Finger  Date 2018   Side L R L L L L   P1 6.6 5.8 6.4 6.2 6.2 6.5   IP 6.6 6.0 6.0 WNL  6.1   P2           Sensation: WNL throughout all nerve distributions; per patient report    ROM:  Pain Report:  - none    + mild    ++ moderate    +++ severe   Middle Finger 2018   AROM(PROM) L MF R MF L MF   MCP 0/55 0/77 /45   PIP 0/83 0/90 /60   DIP 0/40 0/52 /20     Stage of Stenosing Tenosynovitis (SST):   2018   Triggering of Middle finger NO triggering Stage 2-3   Stage 1:  Normal  Stage 2:  Uneven motion of  tendon  Stage 3:  Triggering, clicking, catching  Stage 4:  Locking in extension or flexion; unlocked by active motion  Stage 5:  Locking in extension or flexion; unlocked by passive motion  Stage 6:  Finger locked in extension or flexion    Palpation:   8/21/2018 11/1/18   A1 pulley Middle finger Normal motion   Clicking noted upon palpation     STRENGTH: (Measured in pounds, pain scale 0-10/10)  11/1/18: not tested due to pain   (Pain Free)  Date 8/21/2018        Trials Left Right Left Right Left Right Left Right Left Right Left Right   1 20 50             2               3               Avg               Pain                 3 Point Pinch (Pain free)  Date 8/21/2018        Trials Left Right Left Right Left Right Left Right Left Right Left Right   1 2 9             2               3               Avg               Pain                 Assessment:  Response to therapy has been lack of progress in:  ROM of Fingers: All Planes  Strength:   and pinch  Edema:  increased  Pain:  frequency is more, intensity of pain has increased and more tender over affected area  Self Care Skills:  Increased difficulty gripping and completing ADLs  Response to therapy has been decline in:  ROM of Fingers: All Planes  Pain:  frequency is more and intensity of pain has increased    Overall Assessment:  Patient has experienced an exacerbation of symptoms.  Patient would benefit from continued therapy to achieve rehab potential  STG/LTG:  STGoals have been reviewed;  see goal sheet for details and updates.  LTGoals have been reviewed;  see goal sheet for details and updates.    I have re-evaluated this patient and find that the nature, scope, duration and intensity of the therapy is appropriate for the medical condition of the patient.    Plan:  Frequency:  1 X week, once daily  Duration:  for 6 weeks (total 12 visits)    Treatment Plan:    Modalities:  US  Therapeutic Exercise:  PROM, Tendon Gliding and Blocking  Neuromuscular  re-education:  Nerve Gliding and Kinesiotaping  Manual Techniques:  Friction massage, Myofascial release and Manual edema mobilization  Orthotic Fabrication:  Static orthosis    Discharge Plan:  Achieve all LTG.  Independent in home treatment program.  Reach maximal therapeutic benefit.    Home Exercise Program:  Warmth for stiffness  Ice to A1 pulley/palm for inflammation  Decongestive and TFM to palm and A1 pulley  AROM fingers E/F, avoid triggering  PROM finger flexion  MP Flexion block orthosis sleeping  Oval 8 or ring orthosis day as needed to prevent triggering  Hand based splint for MF extension at night    Next Visit:  Massage to A1  PROM  US  Check splint fit    Thank you for your referral.    INQUIRIES  Therapist: VONNIE Montoya/L   [facility] [place]  [address]  [address]  [facility] [address]  Phone: [phone]  Fax: [phone]

## 2018-11-08 ENCOUNTER — THERAPY VISIT (OUTPATIENT)
Dept: OCCUPATIONAL THERAPY | Facility: CLINIC | Age: 68
End: 2018-11-08
Payer: COMMERCIAL

## 2018-11-08 DIAGNOSIS — M79.645 PAIN OF FINGER OF LEFT HAND: ICD-10-CM

## 2018-11-08 PROCEDURE — 97140 MANUAL THERAPY 1/> REGIONS: CPT | Mod: GO | Performed by: OCCUPATIONAL THERAPIST

## 2018-11-08 PROCEDURE — 97035 APP MDLTY 1+ULTRASOUND EA 15: CPT | Mod: GO | Performed by: OCCUPATIONAL THERAPIST

## 2018-11-08 NOTE — MR AVS SNAPSHOT
"              After Visit Summary   11/8/2018    Halina Edwrads    MRN: [id number]           Patient Information     Date Of Birth          1950        Visit Information        Provider Department      11/8/2018 4:30 PM Dori Limon, [facility]        Today's Diagnoses     Pain of finger of left hand           Follow-ups after your visit        Your next 10 appointments already scheduled     Nov 15, 2018  3:30 PM CST   JACQUELIN Hand with Dori Limon OT   [facility]    [address]  [address]  [place] [address] [phone]              Who to contact     If you have questions or need follow up information about today's clinic visit or your schedule please contact [facility] directly at [phone].  Normal or non-critical lab and imaging results will be communicated to you by [employer]hart, letter or phone within 4 business days after the clinic has received the results. If you do not hear from us within 7 days, please contact the clinic through [employer] or phone. If you have a critical or abnormal lab result, we will notify you by phone as soon as possible.  Submit refill requests through [employer] or call your pharmacy and they will forward the refill request to us. Please allow 3 business days for your refill to be completed.          Additional Information About Your Visit        MyChart Information     [employer] lets you send messages to your doctor, view your test results, renew your prescriptions, schedule appointments and more. To sign up, go to [email or web address] . Click on \"Log in\" on the left side of the screen, which will take you to the Welcome page. Then click on \"Sign up Now\" on the right side of the page.     You will be asked to enter the access code listed below, as well as some personal information. Please follow the directions to create your username and password.     Your access code is: [email or web address]  Expires: " 2018  4:55 PM     Your access code will  in 90 days. If you need help or a new code, please call your [facility] clinic or [phone].        Care EveryWhere ID     This is your Care EveryWhere ID. This could be used by other organizations to access your [facility] medical records  [id number]        Your Vitals Were     Last Period                   09/10/2005            Blood Pressure from Last 3 Encounters:   09/08/10 131/77   06/10/10 151/82   04/01/10 145/81    Weight from Last 3 Encounters:   05 109.2 kg (240 lb 12 oz)   09/15/05 109.8 kg (242 lb)   05 108.4 kg (239 lb 0.6 oz)              We Performed the Following     MANUAL THER TECH,1+REGIONS,EA 15 MIN     ULTRASOUND THERAPY        Primary Care Provider Office Phone # Fax #    Halina Arias PA-C [phone]       [address] [facility] [address]        Equal Access to Services     ANNABEL BACON : Hadii aad ku hadasho Soomaali, waaxda luqadaha, qaybta kaalmada adeegyada, waxay idiin hayaan jeffry luque . So [facility] [phone].    ATENCIÓN: Si habla español, tiene a brewer disposición servicios gratuitos de asistencia lingüística. [facility] [phone].    We comply with applicable federal civil rights laws and [place] laws. We do not discriminate on the basis of race, color, national origin, age, disability, sex, sexual orientation, or gender identity.            Thank you!     Thank you for choosing [facility]  for your care. Our goal is always to provide you with excellent care. Hearing back from our patients is one way we can continue to improve our services. Please take a few minutes to complete the written survey that you may receive in the mail after your visit with us. Thank you!             Your Updated Medication List - Protect others around you: Learn how to safely use, store and throw away your medicines at www.disposemymeds.org.          This list is accurate as of 18  5:49 PM.   Always use your most recent med list.                   Brand Name Dispense Instructions for use Diagnosis    azithromycin 250 MG tablet    ZITHROMAX    6 Tab    take 1 Tab by mouth daily. two tablets first day and 1 tablet daily for 4 days    Maxillary sinusitis       benzonatate 200 MG capsule    TESSALON    20 Cap    take 1 Cap by mouth 3 times daily as needed for cough.    Maxillary sinusitis       metFORMIN 500 MG tablet    GLUCOPHAGE     2 tablets twice daily        METOPROLOL TARTRATE      None Entered        NOVOFINE 31 31G X 6 MM   Generic drug:  insulin pen needle     100    use as directed        NovoLOG FLEXpen 100 UNITS/ML injection   Generic drug:  insulin aspart     15    as directed with each of her 3 meals as of 3/10/2005 11 units each meal)        PRAVACHOL PO      None Entered        RAMIPRIL PO      None Entered

## 2018-11-08 NOTE — PROGRESS NOTES
Objective info for 11/8/18    Middle Finger  Date 8/21/2018 8/21/2018 8/28/18 9/4/18 9/18/18 11/1/18 11/8/18   Side L R L L L L L   P1 6.6 5.8 6.4 6.2 6.2 6.5 6.3   IP 6.6 6.0 6.0 WNL  6.1    P2            Sensation: WNL throughout all nerve distributions; per patient report    ROM:  Pain Report:  - none    + mild    ++ moderate    +++ severe   Middle Finger 8/21/2018 8/21/2018 11/1/18   AROM(PROM) L MF R MF L MF   MCP 0/55 0/77 /45   PIP 0/83 0/90 /60   DIP 0/40 0/52 /20     Please refer to the daily flowsheet for treatment today, total treatment time and time spent performing 1:1 timed codes.

## 2018-11-15 ENCOUNTER — THERAPY VISIT (OUTPATIENT)
Dept: OCCUPATIONAL THERAPY | Facility: CLINIC | Age: 68
End: 2018-11-15
Payer: COMMERCIAL

## 2018-11-15 DIAGNOSIS — M79.645 PAIN OF FINGER OF LEFT HAND: ICD-10-CM

## 2018-11-15 PROCEDURE — 97110 THERAPEUTIC EXERCISES: CPT | Mod: GO | Performed by: OCCUPATIONAL THERAPIST

## 2018-11-15 PROCEDURE — 97035 APP MDLTY 1+ULTRASOUND EA 15: CPT | Mod: GO | Performed by: OCCUPATIONAL THERAPIST

## 2018-11-15 PROCEDURE — 97140 MANUAL THERAPY 1/> REGIONS: CPT | Mod: GO | Performed by: OCCUPATIONAL THERAPIST

## 2018-11-15 NOTE — MR AVS SNAPSHOT
After Visit Summary   11/15/2018    Halina Edwards    MRN: [id number]           Patient Information     Date Of Birth          1950        Visit Information        Provider Department      11/15/2018 3:30 PM Dori Limon, [facility]        Today's Diagnoses     Pain of finger of left hand           Follow-ups after your visit        Your next 10 appointments already scheduled     Nov 29, 2018  2:30 PM CST   JACQUELIN Hand with D[facility] Limon OT   [facility]    [address]  [address]  [place] [address] [phone]              Who to contact     If you have questions or need follow up information about today's clinic visit or your schedule please contact [facility] directly at [phone].  Normal or non-critical lab and imaging results will be communicated to you by MyChart, letter or phone within 4 business days after the clinic has received the results. If you do not hear from us within 7 days, please contact the clinic through MyChart or phone. If you have a critical or abnormal lab result, we will notify you by phone as soon as possible.  Submit refill requests through [employer] or call your pharmacy and they will forward the refill request to us. Please allow 3 business days for your refill to be completed.          Additional Information About Your Visit        Care EveryWhere ID     This is your Care EveryWhere ID. This could be used by other organizations to access your [facility] medical records  [id number]        Your Vitals Were     Last Period                   09/10/2005            Blood Pressure from Last 3 Encounters:   09/08/10 131/77   06/10/10 151/82   04/01/10 145/81    Weight from Last 3 Encounters:   09/27/05 109.2 kg (240 lb 12 oz)   09/15/05 109.8 kg (242 lb)   04/07/05 108.4 kg (239 lb 0.6 oz)              We Performed the Following     MANUAL THER TECH,1+REGIONS,EA 15 MIN     THERAPEUTIC EXERCISES      ULTRASOUND THERAPY        Primary Care Provider Office Phone # Fax #    Halina Arias PA-C [phone] [address] [facility] [address] [facility] [address]        Equal Access to Services     GURPREET BACON : Hadii sarita dueñas sidneyo Sogutierrezali, waaxda luqadaha, qaybta kaalmada adeangelica, braulio brewerrodger león. So [facility] [place] [phone].    ATENCIÓN: Si habla español, tiene a brewer disposición servicios gratuitos de asistencia lingüística. Ll[facility] [phone].    We comply with applicable federal civil rights laws and [place] laws. We do not discriminate on the basis of race, color, national origin, age, disability, sex, sexual orientation, or gender identity.            Thank you!     Thank you for choosing [facility]  for your care. Our goal is always to provide you with excellent care. Hearing back from our patients is one way we can continue to improve our services. Please take a few minutes to complete the written survey that you may receive in the mail after your visit with us. Thank you!             Your Updated Medication List - Protect others around you: Learn how to safely use, store and throw away your medicines at www.disposemymeds.org.          This list is accurate as of 11/15/18  5:26 PM.  Always use your most recent med list.                   Brand Name Dispense Instructions for use Diagnosis    azithromycin 250 MG tablet    ZITHROMAX    6 Tab    take 1 Tab by mouth daily. two tablets first day and 1 tablet daily for 4 days    Maxillary sinusitis       benzonatate 200 MG capsule    TESSALON    20 Cap    take 1 Cap by mouth 3 times daily as needed for cough.    Maxillary sinusitis       metFORMIN 500 MG tablet    GLUCOPHAGE     2 tablets twice daily        METOPROLOL TARTRATE      None Entered        NOVOFINE 31 31G X 6 MM   Generic drug:  insulin pen needle     100    use as directed        NovoLOG FLEXpen 100 UNITS/ML injection   Generic drug:  insulin aspart     15     as directed with each of her 3 meals as of 3/10/2005 11 units each meal)        PRAVACHOL PO      None Entered        RAMIPRIL PO      None Entered

## 2018-11-15 NOTE — PROGRESS NOTES
Objective info for 11/15/18    Middle Finger  Date 8/21/2018 8/21/2018 8/28/18 9/4/18 9/18/18 11/1/18 11/8/18 11/15/18   Side L R L L L L L L   P1 6.6 5.8 6.4 6.2 6.2 6.5 6.3 6.3   IP 6.6 6.0 6.0 WNL  6.1     P2             Sensation: WNL throughout all nerve distributions; per patient report    ROM:  Pain Report:  - none    + mild    ++ moderate    +++ severe   Middle Finger 8/21/2018 8/21/2018 11/1/18   AROM(PROM) L MF R MF L MF   MCP 0/55 0/77 /45   PIP 0/83 0/90 /60   DIP 0/40 0/52 /20     Please refer to the daily flowsheet for treatment today, total treatment time and time spent performing 1:1 timed codes.

## 2018-12-24 ENCOUNTER — TRANSFERRED RECORDS (OUTPATIENT)
Dept: HEALTH INFORMATION MANAGEMENT | Facility: CLINIC | Age: 68
End: 2018-12-24

## 2019-01-02 ENCOUNTER — TRANSFERRED RECORDS (OUTPATIENT)
Dept: HEALTH INFORMATION MANAGEMENT | Facility: CLINIC | Age: 69
End: 2019-01-02

## 2019-01-03 ENCOUNTER — TRANSFERRED RECORDS (OUTPATIENT)
Dept: HEALTH INFORMATION MANAGEMENT | Facility: CLINIC | Age: 69
End: 2019-01-03

## 2019-01-23 ENCOUNTER — HOSPITAL ENCOUNTER (OUTPATIENT)
Dept: CARDIAC REHAB | Facility: CLINIC | Age: 69
End: 2019-01-23
Attending: SURGERY
Payer: COMMERCIAL

## 2019-01-23 PROCEDURE — 93797 PHYS/QHP OP CAR RHAB WO ECG: CPT | Performed by: REHABILITATION PRACTITIONER

## 2019-01-23 PROCEDURE — 93798 PHYS/QHP OP CAR RHAB W/ECG: CPT | Performed by: REHABILITATION PRACTITIONER

## 2019-01-23 PROCEDURE — 40000116 ZZH STATISTIC OP CR VISIT: Performed by: REHABILITATION PRACTITIONER

## 2019-01-23 PROCEDURE — 40000575 ZZH STATISTIC OP CARDIAC VISIT #2: Performed by: REHABILITATION PRACTITIONER

## 2019-01-31 ENCOUNTER — HOSPITAL ENCOUNTER (OUTPATIENT)
Dept: CARDIAC REHAB | Facility: CLINIC | Age: 69
End: 2019-01-31
Attending: SURGERY
Payer: COMMERCIAL

## 2019-01-31 PROCEDURE — 40000116 ZZH STATISTIC OP CR VISIT: Performed by: REHABILITATION PRACTITIONER

## 2019-01-31 PROCEDURE — 93798 PHYS/QHP OP CAR RHAB W/ECG: CPT | Performed by: REHABILITATION PRACTITIONER

## 2019-02-05 ENCOUNTER — HOSPITAL ENCOUNTER (OUTPATIENT)
Dept: CARDIAC REHAB | Facility: CLINIC | Age: 69
End: 2019-02-05
Attending: SURGERY
Payer: COMMERCIAL

## 2019-02-05 PROCEDURE — 40000116 ZZH STATISTIC OP CR VISIT

## 2019-02-05 PROCEDURE — 93798 PHYS/QHP OP CAR RHAB W/ECG: CPT

## 2019-02-07 ENCOUNTER — HOSPITAL ENCOUNTER (OUTPATIENT)
Dept: CARDIAC REHAB | Facility: CLINIC | Age: 69
End: 2019-02-07
Attending: SURGERY
Payer: COMMERCIAL

## 2019-02-07 PROCEDURE — 40000116 ZZH STATISTIC OP CR VISIT

## 2019-02-07 PROCEDURE — 93798 PHYS/QHP OP CAR RHAB W/ECG: CPT

## 2019-02-13 ENCOUNTER — HOSPITAL ENCOUNTER (OUTPATIENT)
Dept: CARDIAC REHAB | Facility: CLINIC | Age: 69
End: 2019-02-13
Attending: SURGERY
Payer: COMMERCIAL

## 2019-02-13 PROCEDURE — 40000116 ZZH STATISTIC OP CR VISIT

## 2019-02-13 PROCEDURE — 93798 PHYS/QHP OP CAR RHAB W/ECG: CPT

## 2019-02-19 ENCOUNTER — HOSPITAL ENCOUNTER (OUTPATIENT)
Dept: CARDIAC REHAB | Facility: CLINIC | Age: 69
End: 2019-02-19
Attending: SURGERY
Payer: COMMERCIAL

## 2019-02-19 PROCEDURE — 93798 PHYS/QHP OP CAR RHAB W/ECG: CPT

## 2019-02-19 PROCEDURE — 40000575 ZZH STATISTIC OP CARDIAC VISIT #2

## 2019-02-19 PROCEDURE — 40000116 ZZH STATISTIC OP CR VISIT

## 2019-02-19 PROCEDURE — 93797 PHYS/QHP OP CAR RHAB WO ECG: CPT

## 2019-02-22 PROBLEM — M79.645 PAIN OF FINGER OF LEFT HAND: Status: RESOLVED | Noted: 2018-08-21 | Resolved: 2019-02-22

## 2019-02-22 NOTE — PROGRESS NOTES
Pt has not returned for therapy since 11/15/18.  Assume all goals are met to pt satisfaction.  D/C [facility].

## 2019-02-26 ENCOUNTER — HOSPITAL ENCOUNTER (OUTPATIENT)
Dept: CARDIAC REHAB | Facility: CLINIC | Age: 69
End: 2019-02-26
Attending: SURGERY
Payer: COMMERCIAL

## 2019-02-26 PROCEDURE — 93798 PHYS/QHP OP CAR RHAB W/ECG: CPT

## 2019-02-26 PROCEDURE — 40000116 ZZH STATISTIC OP CR VISIT

## 2019-02-26 NOTE — ADDENDUM NOTE
Encounter addended by: Adriana Larry EP on: 2/26/2019 8:18 AM   Actions taken: Charge Capture section accepted

## 2019-02-28 ENCOUNTER — HOSPITAL ENCOUNTER (OUTPATIENT)
Dept: CARDIAC REHAB | Facility: CLINIC | Age: 69
End: 2019-02-28
Attending: SURGERY
Payer: COMMERCIAL

## 2019-02-28 PROCEDURE — 40000116 ZZH STATISTIC OP CR VISIT: Performed by: REHABILITATION PRACTITIONER

## 2019-02-28 PROCEDURE — 93798 PHYS/QHP OP CAR RHAB W/ECG: CPT | Performed by: REHABILITATION PRACTITIONER

## 2019-03-06 ENCOUNTER — HOSPITAL ENCOUNTER (OUTPATIENT)
Dept: CARDIAC REHAB | Facility: CLINIC | Age: 69
End: 2019-03-06
Attending: SURGERY
Payer: COMMERCIAL

## 2019-03-06 PROCEDURE — 40000116 ZZH STATISTIC OP CR VISIT

## 2019-03-06 PROCEDURE — 93798 PHYS/QHP OP CAR RHAB W/ECG: CPT

## 2019-03-12 ENCOUNTER — HOSPITAL ENCOUNTER (OUTPATIENT)
Dept: CARDIAC REHAB | Facility: CLINIC | Age: 69
End: 2019-03-12
Attending: SURGERY
Payer: COMMERCIAL

## 2019-03-12 PROCEDURE — 40000116 ZZH STATISTIC OP CR VISIT: Performed by: REHABILITATION PRACTITIONER

## 2019-03-12 PROCEDURE — 93798 PHYS/QHP OP CAR RHAB W/ECG: CPT | Performed by: REHABILITATION PRACTITIONER

## 2019-03-19 ENCOUNTER — HOSPITAL ENCOUNTER (OUTPATIENT)
Dept: CARDIAC REHAB | Facility: CLINIC | Age: 69
End: 2019-03-19
Attending: SURGERY
Payer: COMMERCIAL

## 2019-03-19 PROCEDURE — 93798 PHYS/QHP OP CAR RHAB W/ECG: CPT

## 2019-03-19 PROCEDURE — 40000116 ZZH STATISTIC OP CR VISIT

## 2019-03-21 ENCOUNTER — HOSPITAL ENCOUNTER (OUTPATIENT)
Dept: CARDIAC REHAB | Facility: CLINIC | Age: 69
End: 2019-03-21
Attending: SURGERY
Payer: COMMERCIAL

## 2019-03-21 PROCEDURE — 93798 PHYS/QHP OP CAR RHAB W/ECG: CPT | Performed by: REHABILITATION PRACTITIONER

## 2019-03-21 PROCEDURE — 40000116 ZZH STATISTIC OP CR VISIT: Performed by: REHABILITATION PRACTITIONER

## 2019-03-26 ENCOUNTER — HOSPITAL ENCOUNTER (OUTPATIENT)
Dept: CARDIAC REHAB | Facility: CLINIC | Age: 69
End: 2019-03-26
Attending: SURGERY
Payer: COMMERCIAL

## 2019-03-26 PROCEDURE — 40000116 ZZH STATISTIC OP CR VISIT

## 2019-03-26 PROCEDURE — 93798 PHYS/QHP OP CAR RHAB W/ECG: CPT

## 2019-03-28 ENCOUNTER — HOSPITAL ENCOUNTER (OUTPATIENT)
Dept: CARDIAC REHAB | Facility: CLINIC | Age: 69
End: 2019-03-28
Attending: SURGERY
Payer: COMMERCIAL

## 2019-03-28 PROCEDURE — 93798 PHYS/QHP OP CAR RHAB W/ECG: CPT | Performed by: REHABILITATION PRACTITIONER

## 2019-03-28 PROCEDURE — 40000116 ZZH STATISTIC OP CR VISIT: Performed by: REHABILITATION PRACTITIONER

## 2019-04-04 ENCOUNTER — HOSPITAL ENCOUNTER (OUTPATIENT)
Dept: CARDIAC REHAB | Facility: CLINIC | Age: 69
End: 2019-04-04
Attending: SURGERY
Payer: COMMERCIAL

## 2019-04-04 PROCEDURE — 40000116 ZZH STATISTIC OP CR VISIT: Performed by: REHABILITATION PRACTITIONER

## 2019-04-04 PROCEDURE — 93798 PHYS/QHP OP CAR RHAB W/ECG: CPT | Performed by: REHABILITATION PRACTITIONER

## 2019-04-09 ENCOUNTER — HOSPITAL ENCOUNTER (OUTPATIENT)
Dept: CARDIAC REHAB | Facility: CLINIC | Age: 69
End: 2019-04-09
Attending: SURGERY
Payer: COMMERCIAL

## 2019-04-09 PROCEDURE — 40000116 ZZH STATISTIC OP CR VISIT: Performed by: REHABILITATION PRACTITIONER

## 2019-04-09 PROCEDURE — 93798 PHYS/QHP OP CAR RHAB W/ECG: CPT | Performed by: REHABILITATION PRACTITIONER

## 2019-04-16 ENCOUNTER — HOSPITAL ENCOUNTER (OUTPATIENT)
Dept: CARDIAC REHAB | Facility: CLINIC | Age: 69
End: 2019-04-16
Attending: SURGERY
Payer: COMMERCIAL

## 2019-04-16 PROCEDURE — 93798 PHYS/QHP OP CAR RHAB W/ECG: CPT

## 2019-04-16 PROCEDURE — 40000116 ZZH STATISTIC OP CR VISIT

## 2019-04-18 ENCOUNTER — HOSPITAL ENCOUNTER (OUTPATIENT)
Dept: CARDIAC REHAB | Facility: CLINIC | Age: 69
End: 2019-04-18
Attending: SURGERY
Payer: COMMERCIAL

## 2019-04-18 PROCEDURE — 93798 PHYS/QHP OP CAR RHAB W/ECG: CPT | Performed by: REHABILITATION PRACTITIONER

## 2019-04-18 PROCEDURE — 40000116 ZZH STATISTIC OP CR VISIT: Performed by: REHABILITATION PRACTITIONER

## 2019-04-23 ENCOUNTER — HOSPITAL ENCOUNTER (OUTPATIENT)
Dept: CARDIAC REHAB | Facility: CLINIC | Age: 69
End: 2019-04-23
Attending: SURGERY
Payer: COMMERCIAL

## 2019-04-23 PROCEDURE — 40000116 ZZH STATISTIC OP CR VISIT

## 2019-04-23 PROCEDURE — G0463 HOSPITAL OUTPT CLINIC VISIT: HCPCS

## 2019-04-23 NOTE — PROGRESS NOTES
Pt was unable to exercise due to low blood sugar. Pre exercise blood sugar was 74mm/dL. 20g of carbs were given. Blood sugar was rechecked 10 minutes later, blood sugar dropped to 64mm/dL. 20g of additional carbs were given. Blood sugar was rechecked again 10 minutes later at which time pt's blood sugar was 98mm/dL. Total time spent with pt 40 minutes.

## 2019-04-26 ENCOUNTER — HOSPITAL ENCOUNTER (OUTPATIENT)
Dept: CARDIAC REHAB | Facility: CLINIC | Age: 69
End: 2019-04-26
Attending: SURGERY
Payer: COMMERCIAL

## 2019-04-26 PROCEDURE — 40000116 ZZH STATISTIC OP CR VISIT

## 2019-04-26 PROCEDURE — 93798 PHYS/QHP OP CAR RHAB W/ECG: CPT

## 2019-06-13 PROBLEM — H81.11 BENIGN PAROXYSMAL POSITIONAL VERTIGO, RIGHT: Status: RESOLVED | Noted: 2018-08-20 | Resolved: 2019-06-13

## 2019-08-06 PROBLEM — M54.50 CHRONIC BILATERAL LOW BACK PAIN WITHOUT SCIATICA: Status: RESOLVED | Noted: 2018-08-14 | Resolved: 2019-08-06

## 2019-08-06 PROBLEM — G89.29 CHRONIC BILATERAL LOW BACK PAIN WITHOUT SCIATICA: Status: RESOLVED | Noted: 2018-08-14 | Resolved: 2019-08-06

## 2019-08-06 NOTE — PROGRESS NOTES
Patient did not return for further treatment and no additional progress was noted.  Please refer to the progress note and goal flowsheet completed on 08/14/18 for discharge information.    Gael Vizcaino,PT, DPT, OCS

## 2021-12-01 ENCOUNTER — THERAPY VISIT (OUTPATIENT)
Dept: PHYSICAL THERAPY | Facility: CLINIC | Age: 71
End: 2021-12-01
Payer: COMMERCIAL

## 2021-12-01 DIAGNOSIS — G44.209 TENSION HEADACHE: ICD-10-CM

## 2021-12-01 DIAGNOSIS — M54.2 CERVICALGIA: ICD-10-CM

## 2021-12-01 PROCEDURE — 97140 MANUAL THERAPY 1/> REGIONS: CPT | Mod: GP | Performed by: PHYSICAL THERAPIST

## 2021-12-01 PROCEDURE — 97161 PT EVAL LOW COMPLEX 20 MIN: CPT | Mod: GP | Performed by: PHYSICAL THERAPIST

## 2021-12-01 PROCEDURE — 97110 THERAPEUTIC EXERCISES: CPT | Mod: GP | Performed by: PHYSICAL THERAPIST

## 2021-12-01 NOTE — PROGRESS NOTES
[facility]    OUTPATIENT Physical Therapy ORTHOPEDIC EVALUATION  PLAN OF TREATMENT FOR OUTPATIENT REHABILITATION  (COMPLETE FOR INITIAL CLAIMS ONLY)  Patient's Last Name, First Name, M.I.  YOB: 1950  Halina Edwards    Provider s Name:  [facility]   Medical Record No.  [id number]   Start of Care Date:  12/01/21   Onset Date:   11/15/21   Type:     _X__PT   ___OT Medical Diagnosis:    Encounter Diagnoses   Name Primary?     Cervicalgia      Tension headache         Treatment Diagnosis:  Cervical Whiplash        Goals:     12/01/21 0500   Body Part   Goals listed below are for neck   Goal #1   Goal #1 headaches   Previous Functional Level No headaches   Current Functional Level Headache frequency per week is;Intensity of headache   Performance Level 3 x week PL = 6/10   STG Target Performance Reduce frequency of headaches in a week to;Decrease intensity of headaches to   Performance Level 1 x week with PL = 3/10   Rationale for full and safe concentration;to establish restorative sleep pattern;to improve quality of life and resume normal social activities   Due Date 12/29/21   LTG Target Performance Reduce frequency of headaches in a month to;Decrease intensity of headaches to   Performance Level 2 x month wit PL = 1-3/10   Rationale for full and safe concentration;to establish restorative sleep pattern;to improve quality of life and resume normal social activities   Due Date 01/26/22   Goal #2   Goal #2 sleeping   Previous Functional Level No restrictions   Current Functional Level 2-3 hours without sleep per night   STG Target Performance 1-2 hours without sleep per night   Rationale to establish restorative sleep pattern   Due Date 01/05/22   LTG Target Performance Less than 1 hour without sleep per night   Rationale to establish restorative sleep pattern   Due Date  02/09/22       Therapy Frequency:     Predicted Duration of Therapy Intervention:       Patricia Crouch, PT                 I CERTIFY THE NEED FOR THESE SERVICES FURNISHED UNDER        THIS PLAN OF TREATMENT AND WHILE UNDER MY CARE .             Physician Signature               Date    X_____________________________________________________                             Certification Date From:  12/01/21   Certification Date To:  02/28/22    Referring Provider:  Edelmira Castillo    Initial Assessment        See Epic Evaluation SOC Date: 12/01/21
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
## 2021-12-08 ENCOUNTER — THERAPY VISIT (OUTPATIENT)
Dept: PHYSICAL THERAPY | Facility: CLINIC | Age: 71
End: 2021-12-08
Payer: COMMERCIAL

## 2021-12-08 DIAGNOSIS — M54.2 CERVICALGIA: ICD-10-CM

## 2021-12-08 DIAGNOSIS — G44.209 TENSION HEADACHE: ICD-10-CM

## 2021-12-08 PROCEDURE — 97110 THERAPEUTIC EXERCISES: CPT | Mod: GP | Performed by: PHYSICAL THERAPIST

## 2021-12-08 PROCEDURE — 97140 MANUAL THERAPY 1/> REGIONS: CPT | Mod: GP | Performed by: PHYSICAL THERAPIST

## 2021-12-15 ENCOUNTER — THERAPY VISIT (OUTPATIENT)
Dept: PHYSICAL THERAPY | Facility: CLINIC | Age: 71
End: 2021-12-15
Payer: COMMERCIAL

## 2021-12-15 DIAGNOSIS — M54.2 CERVICALGIA: ICD-10-CM

## 2021-12-15 DIAGNOSIS — G44.209 TENSION HEADACHE: ICD-10-CM

## 2021-12-15 PROCEDURE — 97110 THERAPEUTIC EXERCISES: CPT | Mod: GP | Performed by: PHYSICAL THERAPIST

## 2021-12-15 PROCEDURE — 97140 MANUAL THERAPY 1/> REGIONS: CPT | Mod: GP | Performed by: PHYSICAL THERAPIST

## 2021-12-15 NOTE — LETTER
[facility]  [address]  [facility] [id number] [phone]    2022    Re: Halina CLEMENT Jerry   :   1950  MRN:  [id number]   REFERRING PHYSICIAN:   Edelmira LOJA [facility]    Date of Initial Evaluation:  21  Visits: 3  Rxs Used: 3  Reason for Referral:     Cervicalgia  Tension headache    DISCHARGE REPORT    Progress reporting period is from 21 to 12-15-21  Neck Disability Scores:  21 = 42  12-15-21 = 30 .       SUBJECTIVE  Subjective changes noted by patient: Per SOAP note 12-15-21: Halina reported that her sleep was improving and experienced less frequent HAs but still 3 x week. Baking cookies and grocery shopping aggravated neck.     Current Pain level: 2/10.     Initial Pain level: 6/10.   Changes in function:  Yes (See Goal flowsheet attached for changes in current functional level)  Adverse reaction to treatment or activity: None    OBJECTIVE  Changes noted in objective findings:  Patient has failed to return to therapy so current objective findings are unknown.  Per SOAP note 12-15-21: Shoulder FLex = 125 degrees B. CROM Rot R WNL and L = 75%. Neck feels tired more than it hurts.     ASSESSMENT/PLAN  Updated problem list and treatment plan: Diagnosis 1:  Neck Pain and Headaches  Pain -  US, manual therapy, self management, education, directional preference exercise and home program  Decreased ROM/flexibility - manual therapy, therapeutic exercise and home program  Decreased joint mobility - manual therapy, therapeutic exercise and home program  Decreased strength - therapeutic exercise, therapeutic activities and home program  Inflammation - self management/home program  Impaired muscle performance - neuro re-education and home program  Decreased function - therapeutic activities and home program  Impaired posture - neuro re-education and home program  Re: Halina Edwards   :    "1950  Page 2        STG/LTGs have been met or progress has been made towards goals:  Yes (See Goal flow sheet completed today.)  Assessment of Progress: The patient's condition is improving.  Patient is meeting short term goals and is progressing towards long term goals.  Self Management Plans:  Patient is independent in a home treatment program.  Patient is independent in self management of symptoms.  I have re-evaluated this patient and find that the nature, scope, duration and intensity of the therapy is appropriate for the medical condition of the patient.  Halina continues to require the following intervention to meet STG and LTG's:  PT intervention is no longer required to meet STG/LTG.    Recommendations:  This patient is ready to be discharged from therapy and continue their home treatment program.        Thank you for your referral.    INQUIRIES     Therapist: Patricia \"Maria Ines\" Miko HOLLIS  [address]  [facility] [address]  Phone: [phone]  Fax: [phone]     "

## 2022-02-07 PROBLEM — G44.209 TENSION HEADACHE: Status: RESOLVED | Noted: 2021-12-01 | Resolved: 2022-02-07

## 2022-02-07 PROBLEM — M54.2 CERVICALGIA: Status: RESOLVED | Noted: 2021-12-01 | Resolved: 2022-02-07

## 2022-02-07 NOTE — PROGRESS NOTES
Subjective:  HPI  Physical Exam                    Objective:  System    Physical Exam    General     ROS    Assessment/Plan:    DISCHARGE REPORT    Progress reporting period is from 12-1-21 to 12-15-21  Neck Disability Scores:  12-1-21 = 42  12-15-21 = 30 .       SUBJECTIVE  Subjective changes noted by patient: Per SOAP note 12-15-21: Halina reported that her sleep was improving and experienced less frequent HAs but still 3 x week. Baking cookies and grocery shopping aggravated neck.     Current Pain level: 2/10.     Initial Pain level: 6/10.   Changes in function:  Yes (See Goal flowsheet attached for changes in current functional level)  Adverse reaction to treatment or activity: None    OBJECTIVE  Changes noted in objective findings:  Patient has failed to return to therapy so current objective findings are unknown.  Per SOAP note 12-15-21: Shoulder FLex = 125 degrees B. CROM Rot R WNL and L = 75%. Neck feels tired more than it hurts.     ASSESSMENT/PLAN  Updated problem list and treatment plan: Diagnosis 1:  Neck Pain and Headaches  Pain -  US, manual therapy, self management, education, directional preference exercise and home program  Decreased ROM/flexibility - manual therapy, therapeutic exercise and home program  Decreased joint mobility - manual therapy, therapeutic exercise and home program  Decreased strength - therapeutic exercise, therapeutic activities and home program  Inflammation - self management/home program  Impaired muscle performance - neuro re-education and home program  Decreased function - therapeutic activities and home program  Impaired posture - neuro re-education and home program  STG/LTGs have been met or progress has been made towards goals:  Yes (See Goal flow sheet completed today.)  Assessment of Progress: The patient's condition is improving.  Patient is meeting short term goals and is progressing towards long term goals.  Self Management Plans:  Patient is independent in a home  treatment program.  Patient is independent in self management of symptoms.  I have re-evaluated this patient and find that the nature, scope, duration and intensity of the therapy is appropriate for the medical condition of the patient.  Halina continues to require the following intervention to meet STG and LTG's:  PT intervention is no longer required to meet STG/LTG.    Recommendations:  This patient is ready to be discharged from therapy and continue their home treatment program.    Please refer to the daily flowsheet for treatment today, total treatment time and time spent performing 1:1 timed codes.

## 2023-04-04 ENCOUNTER — TRANSCRIBE ORDERS (OUTPATIENT)
Dept: OTHER | Age: 73
End: 2023-04-04

## 2023-04-04 DIAGNOSIS — M48.062 SPINAL STENOSIS OF LUMBAR REGION WITH NEUROGENIC CLAUDICATION: Primary | ICD-10-CM

## 2023-04-24 ENCOUNTER — THERAPY VISIT (OUTPATIENT)
Dept: PHYSICAL THERAPY | Facility: CLINIC | Age: 73
End: 2023-04-24
Attending: PHYSICIAN ASSISTANT
Payer: COMMERCIAL

## 2023-04-24 DIAGNOSIS — M48.062 SPINAL STENOSIS OF LUMBAR REGION WITH NEUROGENIC CLAUDICATION: ICD-10-CM

## 2023-04-24 DIAGNOSIS — G89.29 CHRONIC BILATERAL LOW BACK PAIN WITHOUT SCIATICA: ICD-10-CM

## 2023-04-24 DIAGNOSIS — M54.50 CHRONIC BILATERAL LOW BACK PAIN WITHOUT SCIATICA: ICD-10-CM

## 2023-04-24 PROCEDURE — 97161 PT EVAL LOW COMPLEX 20 MIN: CPT | Mod: GP | Performed by: PHYSICAL THERAPIST

## 2023-04-24 PROCEDURE — 97110 THERAPEUTIC EXERCISES: CPT | Mod: GP | Performed by: PHYSICAL THERAPIST

## 2023-04-24 NOTE — PROGRESS NOTES
Physical Therapy Initial Evaluation  Subjective:  The history is provided by the patient. No  was used.   Patient Health History  Halina Edwards being seen for low back.     Problem began: 4/4/2023 (MD order).   Problem occurred: age   Pain is reported as 7/10 on pain scale.  General health as reported by patient is fair.  Pertinent medical history includes: diabetes, heart problems, high blood pressure and overweight.   Red flags:  None as reported by patient.   Other medical allergies details: penecillin, sulfa.    Other surgery history details: open heart surgery, vascular surgery for B legs, 2 back surgeries.    Current medications:  Anti-depressants and high blood pressure medication.    Current occupation is retired.      Other job/home tasks details: home tasks.                Therapist Generated HPI Evaluation  Problem details: She has been dealing with low back pain for many years.  The pain has progressed over the years.  The pain increases with standing 5 min, walking ~5 min.  She will rarely wake due to pain.  Sitting limited to 1 hr.  Riding in the car limited to 1 hr.  She does use gabapentin at night to help with pain.  She will get temporary relief with changing positions..         Type of problem:  Lumbar.    This is a chronic condition.  Condition occurred with:  Degenerative joint disease.  Where condition occurred: for unknown reasons.  Patient reports pain:  Lower lumbar spine.  Pain is described as burning and aching and is constant.  Pain radiates to:  Gluteals right and gluteals left. Pain is worse in the P.M..  Since onset symptoms are gradually worsening.  Associated symptoms:  Loss of motion/stiffness and loss of balance. Symptoms are exacerbated by lifting, standing, twisting, walking and carrying  and relieved by nothing.    Previous treatment includes physical therapy. There was mild improvement following previous treatment.  Work activity restrictions: self limiting  activity at home.  Barriers include:  None as reported by patient.                        Objective:  System    Physical Exam    General     ROS  Halina Edwards , : 1950, MRN: [id number]    Physical Therapy Objective Findings  Subjective information, goals, clinical impression, daily documentation and other information found in EPISODES tab.  Objective:     Lumbar Pain    Posture: lumbar shift shoulders to L  Gait:  Increased step width, decreased mena  Lumbar Range of Motion:  Flexion                                              50%  - pain                                                                                                                           Extension 25% - feels better   Right Side Bending 25% - pain   Left Side Bending 25% - pain   Repeated extension- standing    Repeated flexion- sitting No change     Pelvic screen:                                                                         Positive                                            Negative                                             Standing Forward Bend     Gillet(March)     Supine to sit     Sacroiliac provocation test     Pubic symphysis provocation            -resisted hip add at 47     Other:       Hip Screen:                                                                  Positive                                             Negative                                             Hip ROM     Scour     MARJORIE X R - low back    FADIR  x   Other:     Manual Muscle Testing (graded 0-5, measured at 0 degrees unless otherwise noted):                                                                              Right                                  Left                                                     Transversus Abdominus     -Ramos Leg Lowering (deg) 50%    Hip Flex L2 4 - pain in low back 4   Hip Abd     Hip Add     Hip Ext     Knee Flex 5 5   Knee Ext L3 5 5   Ankle Dorsiflexion L4 5 5   Great Toe Extension L5 5 5   Ankle  "Plantar Flexion S1     Other:  iso bridge   0:52/3:00 difficulty 5/5    (+ mild pain, ++ moderate pain, +++ severe pain)    Special Tests:                                                                     Positive                                             Negative                                             Sign of Buttock  x   SLR X R    Robert Test     Ely Test     Prone instability Test     Crossover SLR     Repeated extension prone     Other: slump    Sit<>stand from 19\" bed + for sciatic n tension R    4 reps      Flexibility:                                                              Right                                                 Left                                                      Hamstring SLR 65 SLR 65   Hip flexor Mod tightness Mod tightness   Quadricep     Suzanne's     piriformis Mod tightness Mod tightness   Other:              -If symptoms past hip, must do neuro testing  Dermatome/Sensory  Testing:   Reflex Testing:                                                                 Right                                                  Left                                                     Patellar Tendon     Achilles Tendon     Babinski         Assessment/Plan:    Patient is a 72 year old female with lumbar complaints.    Patient has the following significant findings with corresponding treatment plan.                Diagnosis 1:  Low back pain consistent with stenosis with deconditioning    Pain -  hot/cold therapy, manual therapy, self management, education and home program  Decreased ROM/flexibility - manual therapy, therapeutic exercise, therapeutic activity and home program  Decreased strength - therapeutic exercise, therapeutic activities and home program  Impaired gait - gait training and home program  Decreased function - therapeutic activities and home program  Impaired posture - neuro re-education, therapeutic activities and home program    Therapy Evaluation Codes: "   1) History comprised of:   Personal factors that impact the plan of care:      Age, Past/current experiences and Time since onset of symptoms.    Comorbidity factors that impact the plan of care are:      Diabetes, Heart problems, High blood pressure, Osteoarthritis and Overweight.     Medications impacting care: Anti-depressant, High blood pressure and Pain.  2) Examination of Body Systems comprised of:   Body structures and functions that impact the plan of care:      Lumbar spine.   Activity limitations that impact the plan of care are:      Bending, Dressing, Lifting, Squatting/kneeling, Standing, Walking and Working.  3) Clinical presentation characteristics are:   Stable/Uncomplicated.  4) Decision-Making    Low complexity using standardized patient assessment instrument and/or measureable assessment of functional outcome.  Cumulative Therapy Evaluation is: Low complexity.    Previous and current functional limitations:  (See Goal Flow Sheet for this information)    Short term and Long term goals: (See Goal Flow Sheet for this information)     Communication ability:  Patient appears to be able to clearly communicate and understand verbal and written communication and follow directions correctly.  Treatment Explanation - The following has been discussed with the patient:   RX ordered/plan of care  Anticipated outcomes  Possible risks and side effects  This patient would benefit from PT intervention to resume normal activities.   Rehab potential is good.    Frequency:  1 X week, once daily  Duration:  for 8 weeks  Discharge Plan:  Achieve all LTG.  Independent in home treatment program.  Reach maximal therapeutic benefit.    Please refer to the daily flowsheet for treatment today, total treatment time and time spent performing 1:1 timed codes.     Gael Vizcaino,PT, DPT, OCS

## 2023-05-17 ENCOUNTER — TRANSCRIBE ORDERS (OUTPATIENT)
Dept: OTHER | Age: 73
End: 2023-05-17

## 2023-05-17 DIAGNOSIS — G89.29 CHRONIC RIGHT SHOULDER PAIN: Primary | ICD-10-CM

## 2023-05-17 DIAGNOSIS — M25.511 CHRONIC RIGHT SHOULDER PAIN: Primary | ICD-10-CM

## 2023-05-26 ENCOUNTER — THERAPY VISIT (OUTPATIENT)
Dept: PHYSICAL THERAPY | Facility: CLINIC | Age: 73
End: 2023-05-26
Payer: COMMERCIAL

## 2023-05-26 DIAGNOSIS — G89.29 CHRONIC BILATERAL LOW BACK PAIN WITHOUT SCIATICA: Primary | ICD-10-CM

## 2023-05-26 DIAGNOSIS — M54.50 CHRONIC BILATERAL LOW BACK PAIN WITHOUT SCIATICA: Primary | ICD-10-CM

## 2023-05-26 PROCEDURE — 97140 MANUAL THERAPY 1/> REGIONS: CPT | Mod: GP | Performed by: PHYSICAL THERAPIST

## 2023-05-26 PROCEDURE — 97110 THERAPEUTIC EXERCISES: CPT | Mod: GP | Performed by: PHYSICAL THERAPIST

## 2023-06-02 ENCOUNTER — THERAPY VISIT (OUTPATIENT)
Dept: PHYSICAL THERAPY | Facility: CLINIC | Age: 73
End: 2023-06-02
Payer: COMMERCIAL

## 2023-06-02 DIAGNOSIS — M54.50 CHRONIC BILATERAL LOW BACK PAIN WITHOUT SCIATICA: Primary | ICD-10-CM

## 2023-06-02 DIAGNOSIS — G89.29 CHRONIC BILATERAL LOW BACK PAIN WITHOUT SCIATICA: Primary | ICD-10-CM

## 2023-06-02 PROCEDURE — 97140 MANUAL THERAPY 1/> REGIONS: CPT | Mod: GP | Performed by: PHYSICAL THERAPY ASSISTANT

## 2023-06-02 PROCEDURE — 97110 THERAPEUTIC EXERCISES: CPT | Mod: GP | Performed by: PHYSICAL THERAPY ASSISTANT

## 2023-06-08 ENCOUNTER — HOSPITAL ENCOUNTER (EMERGENCY)
Facility: CLINIC | Age: 73
Discharge: HOME | End: 2023-06-08
Attending: PHYSICIAN ASSISTANT | Admitting: PHYSICIAN ASSISTANT
Payer: COMMERCIAL

## 2023-06-08 ENCOUNTER — NURSE TRIAGE (OUTPATIENT)
Dept: NURSING | Facility: CLINIC | Age: 73
End: 2023-06-08
Payer: COMMERCIAL

## 2023-06-08 ENCOUNTER — APPOINTMENT (OUTPATIENT)
Dept: GENERAL RADIOLOGY | Facility: CLINIC | Age: 73
End: 2023-06-08
Attending: PHYSICIAN ASSISTANT
Payer: COMMERCIAL

## 2023-06-08 VITALS
OXYGEN SATURATION: 95 % | TEMPERATURE: 98 F | SYSTOLIC BLOOD PRESSURE: 127 MMHG | DIASTOLIC BLOOD PRESSURE: 63 MMHG | HEART RATE: 78 BPM | RESPIRATION RATE: 18 BRPM

## 2023-06-08 DIAGNOSIS — J34.89 NASAL DISCOMFORT: ICD-10-CM

## 2023-06-08 PROCEDURE — 99283 EMERGENCY DEPT VISIT LOW MDM: CPT

## 2023-06-08 PROCEDURE — 70140 X-RAY EXAM OF FACIAL BONES: CPT

## 2023-06-08 PROCEDURE — 99284 EMERGENCY DEPT VISIT MOD MDM: CPT | Performed by: PHYSICIAN ASSISTANT

## 2023-06-08 RX ORDER — CEPHALEXIN 500 MG/1
500 CAPSULE ORAL 4 TIMES DAILY
Qty: 40 CAPSULE | Refills: 0 | Status: SHIPPED | OUTPATIENT
Start: 2023-06-08 | End: 2023-06-08

## 2023-06-08 RX ORDER — CEPHALEXIN 500 MG/1
500 CAPSULE ORAL 4 TIMES DAILY
Qty: 40 CAPSULE | Refills: 0 | Status: SHIPPED | OUTPATIENT
Start: 2023-06-08

## 2023-06-08 ASSESSMENT — ACTIVITIES OF DAILY LIVING (ADL): ADLS_ACUITY_SCORE: 35

## 2023-06-09 ENCOUNTER — THERAPY VISIT (OUTPATIENT)
Dept: PHYSICAL THERAPY | Facility: CLINIC | Age: 73
End: 2023-06-09
Payer: COMMERCIAL

## 2023-06-09 DIAGNOSIS — G89.29 CHRONIC BILATERAL LOW BACK PAIN WITHOUT SCIATICA: Primary | ICD-10-CM

## 2023-06-09 DIAGNOSIS — M54.50 CHRONIC BILATERAL LOW BACK PAIN WITHOUT SCIATICA: Primary | ICD-10-CM

## 2023-06-09 PROCEDURE — 97140 MANUAL THERAPY 1/> REGIONS: CPT | Mod: GP | Performed by: PHYSICAL THERAPY ASSISTANT

## 2023-06-09 PROCEDURE — 97110 THERAPEUTIC EXERCISES: CPT | Mod: GP | Performed by: PHYSICAL THERAPY ASSISTANT

## 2023-06-09 NOTE — TELEPHONE ENCOUNTER
Pt had nose piercing done 2 months ago, was trying to clean top part of piercing and back part came off and lodged into nose. Pt tried using tweezers to remove it, unable to. Pt have pain and some nasal bleeding when trying to remove it with tweezers. Tried forcefully blowing nose, unsuccessful.    Advised ED. Pt will go.    Edwar Bray RN, BSN  6/8/2023 at 8:23 PM  [facility]    Reason for Disposition    [1] Caller unable to remove FB AND [2] not removed after using CARE ADVICE    Additional Information    Negative: SEVERE difficulty breathing (e.g., struggling for each breath, speaks in single words, stridor)    Negative: Sounds like a life-threatening emergency to the triager    Negative: Sharp FB    Negative: Button battery FB    Negative: Bleeding from nose    Negative: Severe nose pain    Protocols used: NOSE - FOREIGN BODY-A-AH

## 2023-06-09 NOTE — ED PROVIDER NOTES
History     Chief Complaint   Patient presents with     Foreign Body in Nose     HPI  Halina Edwards is a 72 year old female who presents with a potential foreign body in her right nostril.  She had to take out her nose ring as it has been quite irritated ever since it was placed 2 weeks ago.  She is concerned that the metallic end of the ring is still present on the anterior aspect of her nose.  She thought she could see in the mirror, but could not confirm it.  She does not feel anything metallic.  She does have discomfort of her nostril.  No active bleeding.  No drainage.  Denies fevers or chills.    Allergies:  Allergies   Allergen Reactions     Penicillins      Sulfamethoxazole W/Trimethoprim Hives       Problem List:    Patient Active Problem List    Diagnosis Date Noted     Chronic bilateral low back pain without sciatica 04/24/2023     Priority: Medium     TYPE 2 DIABETES, HBA1C GOAL < 7% 10/31/2010     Priority: Medium     HYPERLIPIDEMIA LDL GOAL <100 10/31/2010     Priority: Medium     Other disorder of menstruation and other abnormal bleeding from female genital tract 09/21/2005     Priority: Medium     Mixed hyperlipidemia 01/19/2005     Priority: Medium     Depressive disorder, not elsewhere classified 01/19/2005     Priority: Medium     Dermatophytosis of nail 01/14/2005     Priority: Medium     Nonspecific abnormal results of liver function study 09/09/2004     Priority: Medium     Pathological gambling 07/01/2004     Priority: Medium     Gambling addiction per pt       Obesity 08/28/2003     Priority: Medium     Problem list name updated by automated process. Provider to review       Hyperlipidemia 05/06/2003     Priority: Medium     Problem list name updated by automated process. Provider to review       Essential hypertension, benign 01/29/2002     Priority: Medium     Diabetes mellitus, type 2 (H)      Priority: Medium     Problem list name updated by automated process. Provider to review        Dermatophytosis of nail      Priority: Medium     Premenstrual tension syndrome 1995     Priority: Medium     Problem list name updated by automated process. Provider to review       Unspecified disorder of menstruation and other abnormal bleeding from female genital tract 1995     Priority: Medium        Past Medical History:    Past Medical History:   Diagnosis Date     Depressive disorder, not elsewhere classified      Essential hypertension, benign      Mixed hyperlipidemia      Nonspecific abnormal results of liver function study 2004     Obesity, unspecified      Other disorder of menstruation and other abnormal bleeding from female genital tract 2005     Pain in joint, site unspecified 96     Pathological gambling 2004     Premenstrual tension syndromes 95     Type II or unspecified type diabetes mellitus without mention of complication, not stated as uncontrolled 2001     Unspecified disorder of menstruation and other abnormal bleeding from female genital tract 95       Past Surgical History:    Past Surgical History:   Procedure Laterality Date     HC SHLDR ARTHROSCOP,PART DEBRIDE  2004    Debridement of under surface rotator cuff tear with right shoulder arthroscopy and decompression.     ZZC LIGATE FALLOPIAN TUBE         Family History:    Family History   Problem Relation Age of Onset     Cancer Mother         breast     Cancer Sister         1/2 sister - breast     Cancer Father         prostate     Heart Disease Father      Heart Disease Sister        Social History:  Marital Status:   [2]  Social History     Tobacco Use     Smoking status: Former     Types: Cigarettes     Quit date: 1994     Years since quittin.4     Tobacco comments:     quit in  after smoking 20 years at 1ppd   Substance Use Topics     Alcohol use: No     Drug use: No        Medications:    cephALEXin (KEFLEX) 500 MG capsule  azithromycin (ZITHROMAX) 250 MG  tablet  benzonatate (TESSALON) 200 MG capsule  METFORMIN  MG OR TABS  METOPROLOL TARTRATE  NOVOFINE 31 31G X 6 MM MISC  NOVOLOG FLEXPEN 100 UNIT/ML SC SOLN  PRAVACHOL OR  RAMIPRIL OR          Review of Systems   All other systems reviewed and are negative.      Physical Exam   BP: 127/63  Pulse: 78  Temp: 98  F (36.7  C)  Resp: 18  SpO2: 95 %      Physical Exam  Vitals and nursing note reviewed.   Constitutional:       General: She is not in acute distress.     Appearance: She is not diaphoretic.   HENT:      Head: Normocephalic and atraumatic.      Right Ear: External ear normal.      Left Ear: External ear normal.      Nose:      Comments: HadSome granulation tissue of the external nare where the post of the nose ring previously been removed.  There is no surrounding erythema, induration, or fluctuance.  Inside of the nostril with no active bleeding.  No drainage.  I do not identify any metallic foreign body.  Nasal passages otherwise negative.  No septal deviation.     Mouth/Throat:      Pharynx: No oropharyngeal exudate.   Eyes:      General: No scleral icterus.        Right eye: No discharge.         Left eye: No discharge.      Conjunctiva/sclera: Conjunctivae normal.      Pupils: Pupils are equal, round, and reactive to light.   Neck:      Thyroid: No thyromegaly.   Cardiovascular:      Rate and Rhythm: Normal rate and regular rhythm.      Heart sounds: Normal heart sounds. No murmur heard.  Pulmonary:      Effort: Pulmonary effort is normal. No respiratory distress.      Breath sounds: Normal breath sounds. No wheezing or rales.   Chest:      Chest wall: No tenderness.   Abdominal:      General: Bowel sounds are normal. There is no distension.      Palpations: Abdomen is soft. There is no mass.      Tenderness: There is no abdominal tenderness. There is no guarding or rebound.   Musculoskeletal:         General: No tenderness or deformity. Normal range of motion.      Cervical back: Normal range of  motion and neck supple.   Lymphadenopathy:      Cervical: No cervical adenopathy.   Skin:     General: Skin is warm and dry.      Capillary Refill: Capillary refill takes less than 2 seconds.      Findings: No erythema or rash.   Neurological:      Mental Status: She is alert and oriented to person, place, and time.      Cranial Nerves: No cranial nerve deficit.   Psychiatric:         Behavior: Behavior normal.         Thought Content: Thought content normal.         ED Course                 Procedures              Critical Care time:  none               Results for orders placed or performed during the hospital encounter of 06/08/23 (from the past 24 hour(s))   XR Facial Bones 1/2 Views    Narrative    EXAM: XR FACIAL BONES 1/2 VIEWS  LOCATION: [facility]  DATE/TIME: 6/8/2023 9:58 PM CDT    INDICATION: potential nasal metallic FB  COMPARISON: None.      Impression    IMPRESSION: No definite metallic foreign body. No definite fracture.       Medications - No data to display    Assessments & Plan (with Medical Decision Making)  Nasal discomfort     72 year old female presents for evaluation of potential right nare foreign body.  She took her nose ring out and did not find the end of the ring.  On exam vital signs are stable.  She has some granulation tissue externally on the external nare.  No induration or fluctuance.  No spreading erythema.  No foreign body appreciated in the nose even with magnified exam.  X-ray confirmed no nasal metallic foreign body.  Patient reassured.  Local therapy is recommended with bacitracin ointment and a bandage.  I did give her printed prescription for cephalexin in the event that she did have worsening symptoms suggestive of underlying cellulitis.  No sign of cellulitis currently, but the patient was verbally concerned about this.  She has had a minor reaction to penicillin in the past, so we did discuss the potential 5% cross-reaction risk.   Indications for ED return reviewed.  Patient was in agreement with this plan and was suitable for discharge.     I have reviewed the nursing notes.    I have reviewed the findings, diagnosis, plan and need for follow up with the patient.           Medical Decision Making  The patient's presentation was of low complexity (an acute and uncomplicated illness or injury).    The patient's evaluation involved:  history and exam without other MDM data elements    The patient's management necessitated only low risk treatment.        New Prescriptions    CEPHALEXIN (KEFLEX) 500 MG CAPSULE    Take 1 capsule (500 mg) by mouth 4 times daily for 10 days       Final diagnoses:   Nasal discomfort     Disclaimer: This note consists of symbols derived from keyboarding, dictation and/or voice recognition software. As a result, there may be errors in the script that have gone undetected. Please consider this when interpreting information found in this chart.      6/8/2023   [facility] EMERGENCY DEPT     Orlando Askew PA-C  06/08/23 [id number]

## 2023-06-09 NOTE — DISCHARGE INSTRUCTIONS
It was a pleasure working with you today!  Thankfully, you did not have a retained foreign body in your nostril.  Try using topical bacitracin over the painful nose areas covered with a bandage.  Change this dressing twice daily.  If you get spreading redness, increasing pain, or fever, start the antibiotic right away.  Otherwise, this should improve with the bacitracin ointment therapy.

## 2023-06-21 ENCOUNTER — THERAPY VISIT (OUTPATIENT)
Dept: PHYSICAL THERAPY | Facility: CLINIC | Age: 73
End: 2023-06-21
Payer: COMMERCIAL

## 2023-06-21 DIAGNOSIS — M25.511 ACUTE PAIN OF RIGHT SHOULDER: ICD-10-CM

## 2023-06-21 PROCEDURE — 97110 THERAPEUTIC EXERCISES: CPT | Mod: GP | Performed by: PHYSICAL THERAPIST

## 2023-06-21 PROCEDURE — 97161 PT EVAL LOW COMPLEX 20 MIN: CPT | Mod: GP | Performed by: PHYSICAL THERAPIST

## 2023-06-21 NOTE — PROGRESS NOTES
06/21/23 0500   Appointment Info   Signing clinician's name / credentials Maria Ines Crouch PT   Total/Authorized Visits 8 ([facility])   Visits Used 1   Medical Diagnosis R Shoulder Pain   PT Tx Diagnosis Suspect R RCT   Quick Adds Certification   Progress Note/Certification   Start of Care Date 06/21/23   Onset of illness/injury or Date of Surgery 05/16/23  (MD referral)   Therapy Frequency 1 x week   Predicted Duration 8 weeks   Certification date from 06/21/23   Certification date to 09/18/23   Progress Note Completed Date 06/21/23   PT Goal 1   Goal Identifier STG #1 - Reaching   Goal Description Flex = 110 degrees with PL = 2/10   Rationale to maximize safety and independence with performance of ADLs and functional tasks;to maximize safety and independence within the home;to maximize safety and independence within the community;to maximize safety and independence with self cares   Target Date 08/02/23   PT Goal 2   Goal Identifier LTG #1 - Reaching   Goal Description Flex 130 degrees with PL = 0-2/10   Rationale to maximize safety and independence with performance of ADLs and functional tasks;to maximize safety and independence within the home;to maximize safety and independence within the community;to maximize safety and independence with self cares   Target Date 09/13/23   Subjective Report   Subjective Report IE   Objective Measures   Objective Measures Objective Measure 1   Objective Measure 1   Objective Measure Active Flex = 95 degrees with PL = 7/10.   Treatment Interventions (PT)   Interventions Manual Therapy;Neuromuscular Re-education;Therapeutic Activity;Therapeutic Procedure/Exercise   Therapeutic Procedure/Exercise   Therapeutic Procedures: strength, endurance, ROM, flexibillity minutes (87858) 15   PTRx Ther Proc 1 Pendulum/Codmans   PTRx Ther Proc 1 - Details 10 reps each direction - F/B SS CW CCW   PTRx Ther Proc 2 Pulley Shoulder Flexion   PTRx Ther Proc 2 - Details 10 x 5 sec    Neuromuscular Re-education   Neuromuscular re-ed of mvmt, balance, coord, kinesthetic sense, posture, proprioception minutes (25650) 5   PTRx Neuro Re-ed 1 Scapular Retraction/Depression   PTRx Neuro Re-ed 1 - Details 10 reps each direction - F/B SS CW CCW   Eval/Assessments   PT Eval, Low Complexity Minutes (95472) 20   Education   Learner/Method Patient;Listening;Reading;Demonstration;Pictures/Video   Plan   Home program PTRX   Plan for next session Advance HEP for strength & ROM to gain improved  function   Total Session Time   Timed Code Treatment Minutes 20   Total Treatment Time (sum of timed and untimed services) 40         [facility]                                                                                   OUTPATIENT PHYSICAL THERAPY    PLAN OF TREATMENT FOR OUTPATIENT REHABILITATION   Patient's Last Name, First Name, Halina Chen YOB: 1950   Provider's Name   [facility]   Medical Record No.  [id number]     Onset Date: 05/16/23 (MD referral)  Start of Care Date: 06/21/23     Medical Diagnosis:  R Shoulder Pain      PT Treatment Diagnosis:  Suspect R RCT Plan of Treatment  Frequency/Duration: 1 x week/ 8 weeks    Certification date from 06/21/23 to 09/18/23         See note for plan of treatment details and functional goals     Patricia Crouch, PT                         I CERTIFY THE NEED FOR THESE SERVICES FURNISHED UNDER        THIS PLAN OF TREATMENT AND WHILE UNDER MY CARE     (Physician attestation of this document indicates review and certification of the therapy plan).                Referring Provider:  Edelmira Castillo      Initial Assessment  See Epic Evaluation- Start of Care Date: 06/21/23

## 2023-06-21 NOTE — PROGRESS NOTES
PHYSICAL THERAPY EVALUATION  Type of Visit: Evaluation    See electronic medical record for Abuse and Falls Screening details.    Subjective      Presenting condition or subjective complaint: R Shoulder Pain  Date of onset: 05/16/23 (MD referral)    Relevant medical history: Depression; Diabetes; High blood pressure; Overweight; Pain at night or rest   Dates & types of surgery: Open Heart Surgery and Arterial Leg surgeries    Prior diagnostic imaging/testing results:   No imaging of shoulder   Prior therapy history for the same diagnosis, illness or injury: No      Living Environment  Social support: With a significant other or spouse   Type of home: House; 1 level   Stairs to enter the home: Yes 3 Is there a railing: Yes   Ramp: No   Stairs inside the home: No       Help at home: None  Equipment owned: Straight Cane; Walker; Walker with wheels; Standard wheelchair; Bath bench     Employment: No    Hobbies/Interests: Cooking & entertaining    Patient goals for therapy: Not have pain when moving arm    Pain assessment: See objective evaluation for additional pain details     Objective    SHOULDER EVALUATION  PAIN: Pain Level at Rest: 7/10  Pain Level with Use: 10/10  Pain Location: R lateral shoulder pain  Pain Quality: Aching and Sharp  Pain Frequency: constant  Pain is Worst: N/A  Pain is Exacerbated By: Pushing and Reaching  INTEGUMENTARY (edema, incisions): WNL  POSTURE: Sitting Posture: Rounded shoulders, Forward head, Thoracic kyphosis increased    ROM:   (Degrees) Left AROM Left PROM Right AROM  Right PROM   Shoulder Flexion 125  95++ 110+   Shoulder Extension       Shoulder Abduction 150 plane of scap  150+ plane of scap 110+   Shoulder Adduction       Shoulder Internal Rotation       Shoulder External Rotation    70+   Shoulder Horizontal Abduction       Shoulder Horizontal Adduction       Shoulder Flexion ER T2  C7+    Shoulder Flexion IR Ext/IR =T12  Ext/IR = R back pocket++    Elbow Extension       Elbow  Flexion       Pain:   End feel:     STRENGTH:   Pain: - none + mild ++ moderate +++ severe  Strength Scale: 0-5/5 Left Right   Shoulder Flexion  4+   Shoulder Extension     Shoulder Abduction  4+  ++   Shoulder Adduction     Shoulder Internal Rotation  4+   Shoulder External Rotation  4-  ++   Shoulder Horizontal Abduction     Shoulder Horizontal Adduction     Elbow Flexion     Elbow Extension     Mid Trap     Lower Trap     Rhomboid     Serratus Anterior         PALPATION: TTP R supraspinatus insertion and medial deltoid  Assessment & Plan   CLINICAL IMPRESSIONS   Medical Diagnosis: R Shoulder Pain    Treatment Diagnosis: Suspect R RCT   Impression/Assessment: Patient is a 72 year old female with R shoulder complaints after a fall onsteps ~ 10 weeks ago (landing on shoulder).  The following significant findings have been identified: Pain, Decreased ROM/flexibility, Decreased joint mobility, Decreased strength, Impaired muscle performance, Decreased activity tolerance and Impaired posture. These impairments interfere with their ability to perform self care tasks, household chores, driving , household mobility and community mobility as compared to previous level of function.     Clinical Decision Making (Complexity):   Clinical Presentation: Stable/Uncomplicated  Clinical Presentation Rationale: based on medical and personal factors listed in PT evaluation  Clinical Decision Making (Complexity): Low complexity    PLAN OF CARE  Treatment Interventions:  Modalities: Cryotherapy, Ultrasound  Interventions: Manual Therapy, Neuromuscular Re-education, Therapeutic Activity, Therapeutic Exercise, Self-Care/Home Management    Long Term Goals     PT Goal 1  Goal Identifier: STG #1 - Reaching  Goal Description: Flex = 110 degrees with PL = 2/10  Rationale: to maximize safety and independence with performance of ADLs and functional tasks;to maximize safety and independence within the home;to maximize safety and independence  within the community;to maximize safety and independence with self cares  Target Date: 08/02/23  PT Goal 2  Goal Identifier: LTG #1 - Reaching  Goal Description: Flex 130 degrees with PL = 0-2/10  Rationale: to maximize safety and independence with performance of ADLs and functional tasks;to maximize safety and independence within the home;to maximize safety and independence within the community;to maximize safety and independence with self cares  Target Date: 09/13/23      Frequency of Treatment: 1 x week  Duration of Treatment: 8 weeks    Recommended Referrals to Other Professionals: Physical Therapy  Education Assessment:   Learner/Method: Patient;Listening;Reading;Demonstration;Pictures/Video    Risks and benefits of evaluation/treatment have been explained.   Patient/Family/caregiver agrees with Plan of Care.     Evaluation Time:     PT Eval, Low Complexity Minutes (84666): 20      Signing Clinician: Patricia Crouch, BUNNY      [facility]                                                                                   OUTPATIENT PHYSICAL THERAPY      PLAN OF TREATMENT FOR OUTPATIENT REHABILITATION   Patient's Last Name, First Name, Halina Chen YOB: 1950   Provider's Name   [facility]   Medical Record No.  [id number]     Onset Date: 05/16/23 (MD referral)  Start of Care Date: 06/21/23     Medical Diagnosis:  R Shoulder Pain      PT Treatment Diagnosis:  Suspect R RCT Plan of Treatment  Frequency/Duration: 1 x week/ 8 weeks    Certification date from 06/21/23 to 09/18/23         See note for plan of treatment details and functional goals     Patricia Crouch, PT                         I CERTIFY THE NEED FOR THESE SERVICES FURNISHED UNDER        THIS PLAN OF TREATMENT AND WHILE UNDER MY CARE     (Physician attestation of this document indicates review and certification of the therapy plan).                  Referring  Provider:  Edelmira Castillo      Initial Assessment  See Epic Evaluation- Start of Care Date: 06/21/23

## 2023-06-27 ENCOUNTER — THERAPY VISIT (OUTPATIENT)
Dept: PHYSICAL THERAPY | Facility: CLINIC | Age: 73
End: 2023-06-27
Payer: COMMERCIAL

## 2023-06-27 DIAGNOSIS — M25.511 ACUTE PAIN OF RIGHT SHOULDER: Primary | ICD-10-CM

## 2023-06-27 PROCEDURE — 97140 MANUAL THERAPY 1/> REGIONS: CPT | Mod: GP | Performed by: PHYSICAL THERAPIST

## 2023-06-27 PROCEDURE — 97110 THERAPEUTIC EXERCISES: CPT | Mod: GP | Performed by: PHYSICAL THERAPIST

## 2023-07-12 ENCOUNTER — THERAPY VISIT (OUTPATIENT)
Dept: PHYSICAL THERAPY | Facility: CLINIC | Age: 73
End: 2023-07-12
Payer: COMMERCIAL

## 2023-07-12 DIAGNOSIS — M25.511 ACUTE PAIN OF RIGHT SHOULDER: Primary | ICD-10-CM

## 2023-07-12 PROCEDURE — 97140 MANUAL THERAPY 1/> REGIONS: CPT | Mod: GP | Performed by: PHYSICAL THERAPY ASSISTANT

## 2023-07-12 PROCEDURE — 97110 THERAPEUTIC EXERCISES: CPT | Mod: GP | Performed by: PHYSICAL THERAPY ASSISTANT

## 2023-09-12 PROBLEM — M25.511 ACUTE PAIN OF RIGHT SHOULDER: Status: RESOLVED | Noted: 2023-06-21 | Resolved: 2023-09-12

## 2023-09-12 PROBLEM — G89.29 CHRONIC BILATERAL LOW BACK PAIN WITHOUT SCIATICA: Status: RESOLVED | Noted: 2023-04-24 | Resolved: 2023-09-12

## 2023-09-12 PROBLEM — M54.50 CHRONIC BILATERAL LOW BACK PAIN WITHOUT SCIATICA: Status: RESOLVED | Noted: 2023-04-24 | Resolved: 2023-09-12

## 2023-09-12 NOTE — PROGRESS NOTES
06/09/23 0500   Appointment Info   Signing clinician's name / credentials Andreia Sesay, PTA/Gael Vizcaino PT, OCS   Total/Authorized Visits 8   Visits Used 3   Medical Diagnosis lumbar stenosis   PT Tx Diagnosis LBP   Quick Adds PTA Supervision   Progress Note/Certification   Therapy Frequency 1x/ week   Predicted Duration 8 week   Progress Note Completed Date 04/24/23   Supervision   PT Assistant Visit Number 2   GOALS   PT Goals 2   PT Goal 1   Goal Identifier Ambulation   Goal Description pt able to walk 10 min with 6/10 pain   Rationale to maximize safety and independence with performance of ADLs and functional tasks;to maximize safety and independence within the home;to maximize safety and independence within the community;to maximize safety and independence with transportation;to maximize safety and independence with self cares   Goal Progress short distance only with cane   Target Date 06/12/23   PT Goal 2   Goal Identifier ambulation   Goal Description Pt able to walk x 10 min with 4/10 pain   Rationale to maximize safety and independence with performance of ADLs and functional tasks;to maximize safety and independence within the home;to maximize safety and independence within the community;to maximize safety and independence with transportation;to maximize safety and independence with self cares   Target Date 06/19/23   Subjective Report   Subjective Report Pt states her back pain has not changed, although she does support exercising for management of sx.   Objective Measures   Objective Measures Objective Measure 1;Objective Measure 2   Objective Measure 1   Objective Measure palpation   Details hypertonic L>R paraspinal muscles   Objective Measure 2   Objective Measure transfers   Details requires [place] for supine to sit   Treatment Interventions (PT)   Interventions Therapeutic Procedure/Exercise;Manual Therapy   Therapeutic Procedure/Exercise   Therapeutic Procedures: strength, endurance, ROM,  "flexibillity minutes (29844) 15   Therapeutic Procedures Ther Proc 7;Ther Proc 8   Ther Proc 1 lumbar trunk rotation   Ther Proc 1 - Details 2 x 1 min   Ther Proc 7 single knee to chest   Ther Proc 7 - Details 10\" x 5 each   Ther Proc 8 nustep 7 min for warm up   PTRx Ther Proc 1 Bridging #1   PTRx Ther Proc 1 - Details 10x   PTRx Ther Proc 2 Supine Abdominal Exercise #2 ([place])   PTRx Ther Proc 2 - Details hold as minimizing HEP to 3 exercises to assess response   PTRx Ther Proc 3 Nerve Mobility Slump   PTRx Ther Proc 3 - Details reviewed for HEP   PTRx Ther Proc 4 Sit to Stand   PTRx Ther Proc 4 - Details hold as minimizing HEP to 3 exercises to assess response   PTRx Ther Proc 5 Roll Ins Hooklying   PTRx Ther Proc 5 - Details hold as minimizing HEP to 3 exercises to assess response   PTRx Ther Proc 6 Supine Abdominal Exercise #3 (Marching)   PTRx Ther Proc 6 - Details hold as minimizing HEP to 3 exercises to assess response   Skilled Intervention modified HEP per sx level   Patient Response/Progress tolerated well   Manual Therapy   Manual Therapy: Mobilization, MFR, MLD, friction massage minutes (19130) 18   Manual Therapy 1 STM to B low back (L sdly)   Manual Therapy 1 - Details paraspinals and scar regions   Skilled Intervention manual assist to reduce tension   Patient Response/Progress decreased pain level   Education   Learner/Method Patient;Pictures/Video   Plan   Home program cont as able   Updates to plan of care cont per PT POC   Plan for next session progress as able   Total Session Time   Timed Code Treatment Minutes 33   Total Treatment Time (sum of timed and untimed services) 33         DISCHARGE  Reason for Discharge: Patient has not made expected progress due to interrupted treatment attendance.  Patient has failed to schedule further appointments.    Equipment Issued: none    Discharge Plan: Patient to continue home program.    Referring Provider:  Edelmira Castillo    "

## 2023-09-12 NOTE — PROGRESS NOTES
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 RCT   Quick Adds Certification;PTA Supervision   Progress Note/Certification   Start of Care Date 06/21/23   Onset of illness/injury or Date of Surgery 05/16/23  (MD referral)   Therapy Frequency 1 x week   Predicted Duration 8 weeks   Certification date from 06/21/23   Certification date to 09/18/23   Progress Note Completed Date 06/21/23   Supervision   PT Assistant Visit Number 1   PT Goal 1   Goal Identifier STG #1 - Reaching   Goal Description Flex = 110 degrees with PL = 2/10   Rationale to maximize safety and independence with performance of ADLs and functional tasks;to maximize safety and independence within the home;to maximize safety and independence within the community;to maximize safety and independence with self cares   Goal Progress 3/10 PL with 115 degrees flexion, pt progressing   Target Date 08/02/23   PT Goal 2   Goal Identifier LTG #1 - Reaching   Goal Description Flex 130 degrees with PL = 0-2/10   Rationale to maximize safety and independence with performance of ADLs and functional tasks;to maximize safety and independence within the home;to maximize safety and independence within the community;to maximize safety and independence with self cares   Target Date 09/13/23   Subjective Report   Subjective Report Pt pleased with HEP set up as she feels motion is improving. Pain level remains present.   Objective Measures   Objective Measures Objective Measure 2   Objective Measure 1   Objective Measure AROM (degrees) R shoulder   Details 115 flexion, abd 70++, ER 45, IR back pocket, ext 40   Objective Measure 2   Objective Measure Pain level   Details varies 3-6/10 with motion   Treatment Interventions (PT)   Interventions Manual Therapy;Neuromuscular  "Re-education;Therapeutic Activity;Therapeutic Procedure/Exercise   Therapeutic Procedure/Exercise   Therapeutic Procedures: strength, endurance, ROM, flexibillity minutes (68631) 25   PTRx Ther Proc 1 Pendulum/Codmans   PTRx Ther Proc 1 - Details HEP   PTRx Ther Proc 2 Pulley Shoulder Flexion/scaption   PTRx Ther Proc 2 - Details 3 min   PTRx Ther Proc 3 Wand Shoulder External Rotation in Neutral   PTRx Ther Proc 3 - Details HEP   PTRx Ther Proc 4 shld isometrics   PTRx Ther Proc 4 - Details flex, ext, ER 5\" x 10 each   PTRx Ther Proc 5 Wall Climb   PTRx Ther Proc 5 - Details 10x with 5 second holds   Skilled Intervention intiated isometrics for strengthening   Patient Response/Progress tolerated well, no increase in pain level   Manual Therapy   Manual Therapy: Mobilization, MFR, MLD, friction massage minutes (85292) 15   Manual Therapy 1 STM and MFR   Manual Therapy 1 - Details In supine, to R upper arm, upper trapezius, RTC, levators   Skilled Intervention manual assist to reduce tension and promote tissue extensibility   Patient Response/Progress tenderness R pec minor and supraspinatus region   Education   Learner/Method Patient;Listening;Reading;Demonstration;Pictures/Video   Plan   Home program PTRX   Updates to plan of care cont per PT POC   Plan for next session Advance HEP for strength & ROM to gain improved  function   Total Session Time   Timed Code Treatment Minutes 40   Total Treatment Time (sum of timed and untimed services) 40           DISCHARGE  Reason for Discharge: Patient has failed to schedule further appointments.    Equipment Issued: none    Discharge Plan: Patient to continue home program.    Referring Provider:  Edelmira Castillo"